# Patient Record
Sex: FEMALE | Race: WHITE | Employment: FULL TIME | URBAN - NONMETROPOLITAN AREA
[De-identification: names, ages, dates, MRNs, and addresses within clinical notes are randomized per-mention and may not be internally consistent; named-entity substitution may affect disease eponyms.]

---

## 2018-10-22 ENCOUNTER — OFFICE VISIT (OUTPATIENT)
Dept: PRIMARY CARE CLINIC | Age: 18
End: 2018-10-22
Payer: COMMERCIAL

## 2018-10-22 VITALS
WEIGHT: 154 LBS | RESPIRATION RATE: 18 BRPM | DIASTOLIC BLOOD PRESSURE: 72 MMHG | OXYGEN SATURATION: 97 % | SYSTOLIC BLOOD PRESSURE: 126 MMHG | HEIGHT: 63 IN | BODY MASS INDEX: 27.29 KG/M2 | TEMPERATURE: 97.7 F | HEART RATE: 74 BPM

## 2018-10-22 DIAGNOSIS — H10.31 ACUTE BACTERIAL CONJUNCTIVITIS OF RIGHT EYE: Primary | ICD-10-CM

## 2018-10-22 PROCEDURE — 99213 OFFICE O/P EST LOW 20 MIN: CPT | Performed by: FAMILY MEDICINE

## 2018-10-22 PROCEDURE — G8419 CALC BMI OUT NRM PARAM NOF/U: HCPCS | Performed by: FAMILY MEDICINE

## 2018-10-22 PROCEDURE — G8427 DOCREV CUR MEDS BY ELIG CLIN: HCPCS | Performed by: FAMILY MEDICINE

## 2018-10-22 PROCEDURE — 1036F TOBACCO NON-USER: CPT | Performed by: FAMILY MEDICINE

## 2018-10-22 PROCEDURE — G8484 FLU IMMUNIZE NO ADMIN: HCPCS | Performed by: FAMILY MEDICINE

## 2018-10-22 ASSESSMENT — ENCOUNTER SYMPTOMS
GASTROINTESTINAL NEGATIVE: 1
ALLERGIC/IMMUNOLOGIC NEGATIVE: 1
EYE PAIN: 0
RESPIRATORY NEGATIVE: 1
EYE DISCHARGE: 1
EYE REDNESS: 1

## 2018-10-22 NOTE — PROGRESS NOTES
encounter: 154 lb (69.9 kg). Physical Exam   Constitutional: She is oriented to person, place, and time. She appears well-developed and well-nourished. No distress. HENT:   Head: Normocephalic and atraumatic. Right Ear: External ear normal.   Left Ear: External ear normal.   Mouth/Throat: Oropharynx is clear and moist. No oropharyngeal exudate. Eyes: EOM are normal. Right conjunctiva is injected (mild sclera injection). No scleral icterus. Neck: Neck supple. No thyromegaly present. Cardiovascular: Normal rate, regular rhythm, normal heart sounds and intact distal pulses. No murmur heard. Pulmonary/Chest: Effort normal and breath sounds normal. No respiratory distress. She has no wheezes. Abdominal: Soft. Bowel sounds are normal. She exhibits no distension. There is no tenderness. There is no rebound. Musculoskeletal: Normal range of motion. She exhibits no edema or tenderness. Neurological: She is alert and oriented to person, place, and time. Skin: Skin is warm and dry. No rash noted. No erythema. Psychiatric: She has a normal mood and affect. Her behavior is normal. Judgment and thought content normal.   /72 (Site: Left Upper Arm, Position: Sitting, Cuff Size: Medium Adult)   Pulse 74   Temp 97.7 °F (36.5 °C) (Tympanic)   Resp 18   Ht 5' 2.5\" (1.588 m)   Wt 154 lb (69.9 kg)   SpO2 97%   BMI 27.72 kg/m²       ASSESSMENT/PLAN:  Conjunctivitis OD  maxitrol opth. Drops. Handwashing precautions. Recheck prn persistent or progressive symptoms  An electronic signature was used to authenticate this note.     --Benjamin Daugherty MD on 10/22/2018 at 1:51 PM

## 2018-10-29 ENCOUNTER — OFFICE VISIT (OUTPATIENT)
Dept: PRIMARY CARE CLINIC | Age: 18
End: 2018-10-29
Payer: COMMERCIAL

## 2018-10-29 VITALS
BODY MASS INDEX: 27.78 KG/M2 | HEIGHT: 63 IN | DIASTOLIC BLOOD PRESSURE: 80 MMHG | SYSTOLIC BLOOD PRESSURE: 100 MMHG | HEART RATE: 82 BPM | WEIGHT: 156.8 LBS | OXYGEN SATURATION: 98 %

## 2018-10-29 DIAGNOSIS — H10.32 ACUTE CONJUNCTIVITIS OF LEFT EYE, UNSPECIFIED ACUTE CONJUNCTIVITIS TYPE: Primary | ICD-10-CM

## 2018-10-29 PROCEDURE — G8484 FLU IMMUNIZE NO ADMIN: HCPCS | Performed by: NURSE PRACTITIONER

## 2018-10-29 PROCEDURE — G8427 DOCREV CUR MEDS BY ELIG CLIN: HCPCS | Performed by: NURSE PRACTITIONER

## 2018-10-29 PROCEDURE — 1036F TOBACCO NON-USER: CPT | Performed by: NURSE PRACTITIONER

## 2018-10-29 PROCEDURE — 99213 OFFICE O/P EST LOW 20 MIN: CPT | Performed by: NURSE PRACTITIONER

## 2018-10-29 PROCEDURE — G8419 CALC BMI OUT NRM PARAM NOF/U: HCPCS | Performed by: NURSE PRACTITIONER

## 2018-10-29 RX ORDER — ERYTHROMYCIN 5 MG/G
OINTMENT OPHTHALMIC
Qty: 1 TUBE | Refills: 0 | Status: ON HOLD | OUTPATIENT
Start: 2018-10-29 | End: 2020-05-04 | Stop reason: HOSPADM

## 2018-10-29 ASSESSMENT — PATIENT HEALTH QUESTIONNAIRE - PHQ9
1. LITTLE INTEREST OR PLEASURE IN DOING THINGS: 0
SUM OF ALL RESPONSES TO PHQ QUESTIONS 1-9: 0
SUM OF ALL RESPONSES TO PHQ QUESTIONS 1-9: 0
2. FEELING DOWN, DEPRESSED OR HOPELESS: 0
SUM OF ALL RESPONSES TO PHQ9 QUESTIONS 1 & 2: 0

## 2018-10-29 ASSESSMENT — ENCOUNTER SYMPTOMS
GASTROINTESTINAL NEGATIVE: 1
TROUBLE SWALLOWING: 0
SINUS PRESSURE: 0
COUGH: 0
RHINORRHEA: 0
RESPIRATORY NEGATIVE: 1
EYE REDNESS: 1
EYE DISCHARGE: 1

## 2018-10-30 ENCOUNTER — OFFICE VISIT (OUTPATIENT)
Dept: OPTOMETRY | Age: 18
End: 2018-10-30
Payer: COMMERCIAL

## 2018-10-30 DIAGNOSIS — H10.013 ACUTE FOLLICULAR CONJUNCTIVITIS OF BOTH EYES: Primary | ICD-10-CM

## 2018-10-30 DIAGNOSIS — H11.32 SUBCONJUNCTIVAL HEMORRHAGE OF LEFT EYE: ICD-10-CM

## 2018-10-30 PROCEDURE — 99203 OFFICE O/P NEW LOW 30 MIN: CPT | Performed by: OPTOMETRIST

## 2018-10-30 PROCEDURE — G8419 CALC BMI OUT NRM PARAM NOF/U: HCPCS | Performed by: OPTOMETRIST

## 2018-10-30 PROCEDURE — 1036F TOBACCO NON-USER: CPT | Performed by: OPTOMETRIST

## 2018-10-30 PROCEDURE — G8484 FLU IMMUNIZE NO ADMIN: HCPCS | Performed by: OPTOMETRIST

## 2018-10-30 PROCEDURE — G8427 DOCREV CUR MEDS BY ELIG CLIN: HCPCS | Performed by: OPTOMETRIST

## 2018-10-30 ASSESSMENT — VISUAL ACUITY
OS_SC: 20/25
METHOD: SNELLEN - LINEAR
OD_SC: 20/30

## 2020-05-02 ENCOUNTER — HOSPITAL ENCOUNTER (INPATIENT)
Age: 20
LOS: 2 days | Discharge: HOME | DRG: 885 | End: 2020-05-04
Attending: PSYCHIATRY & NEUROLOGY | Admitting: PSYCHIATRY & NEUROLOGY
Payer: COMMERCIAL

## 2020-05-02 PROBLEM — F32.9 MDD (MAJOR DEPRESSIVE DISORDER), SINGLE EPISODE: Status: ACTIVE | Noted: 2020-05-02

## 2020-05-02 LAB
ACETAMINOPHEN LEVEL: < 5 UG/ML (ref 0–20)
ALBUMIN SERPL-MCNC: 4.2 G/DL (ref 3.5–5.1)
ALP BLD-CCNC: 69 U/L (ref 38–126)
ALT SERPL-CCNC: 9 U/L (ref 11–66)
AMORPHOUS: ABNORMAL
AMPHETAMINE+METHAMPHETAMINE URINE SCREEN: NEGATIVE
ANION GAP SERPL CALCULATED.3IONS-SCNC: 11 MEQ/L (ref 8–16)
AST SERPL-CCNC: 11 U/L (ref 5–40)
BACTERIA: ABNORMAL
BARBITURATE QUANTITATIVE URINE: NEGATIVE
BASOPHILS # BLD: 0.5 %
BASOPHILS ABSOLUTE: 0 THOU/MM3 (ref 0–0.1)
BENZODIAZEPINE QUANTITATIVE URINE: NEGATIVE
BILIRUB SERPL-MCNC: 0.5 MG/DL (ref 0.3–1.2)
BILIRUBIN URINE: NEGATIVE
BLOOD, URINE: NEGATIVE
BUN BLDV-MCNC: 8 MG/DL (ref 7–22)
CALCIUM SERPL-MCNC: 9.2 MG/DL (ref 8.5–10.5)
CANNABINOID QUANTITATIVE URINE: NEGATIVE
CASTS: ABNORMAL /LPF
CASTS: ABNORMAL /LPF
CHARACTER, URINE: ABNORMAL
CHLORIDE BLD-SCNC: 106 MEQ/L (ref 98–111)
CO2: 24 MEQ/L (ref 23–33)
COCAINE METABOLITE QUANTITATIVE URINE: NEGATIVE
COLOR: YELLOW
CREAT SERPL-MCNC: 0.5 MG/DL (ref 0.4–1.2)
CRYSTALS: ABNORMAL
EOSINOPHIL # BLD: 0.7 %
EOSINOPHILS ABSOLUTE: 0.1 THOU/MM3 (ref 0–0.4)
EPITHELIAL CELLS, UA: ABNORMAL /HPF
ERYTHROCYTE [DISTWIDTH] IN BLOOD BY AUTOMATED COUNT: 12.1 % (ref 11.5–14.5)
ERYTHROCYTE [DISTWIDTH] IN BLOOD BY AUTOMATED COUNT: 41.7 FL (ref 35–45)
ETHYL ALCOHOL, SERUM: < 0.01 %
GFR SERPL CREATININE-BSD FRML MDRD: > 90 ML/MIN/1.73M2
GLUCOSE BLD-MCNC: 90 MG/DL (ref 70–108)
GLUCOSE, URINE: 250 MG/DL
HCT VFR BLD CALC: 44.2 % (ref 37–47)
HEMOGLOBIN: 14.7 GM/DL (ref 12–16)
IMMATURE GRANS (ABS): 0.01 THOU/MM3 (ref 0–0.07)
IMMATURE GRANULOCYTES: 0.1 %
KETONES, URINE: 40
LEUKOCYTE ESTERASE, URINE: ABNORMAL
LYMPHOCYTES # BLD: 25.5 %
LYMPHOCYTES ABSOLUTE: 2.2 THOU/MM3 (ref 1–4.8)
MCH RBC QN AUTO: 30.9 PG (ref 26–33)
MCHC RBC AUTO-ENTMCNC: 33.3 GM/DL (ref 32.2–35.5)
MCV RBC AUTO: 93.1 FL (ref 81–99)
MISCELLANEOUS LAB TEST RESULT: ABNORMAL
MONOCYTES # BLD: 9.6 %
MONOCYTES ABSOLUTE: 0.8 THOU/MM3 (ref 0.4–1.3)
MUCUS: ABNORMAL
NITRITE, URINE: NEGATIVE
NUCLEATED RED BLOOD CELLS: 0 /100 WBC
OPIATES, URINE: NEGATIVE
OSMOLALITY CALCULATION: 279.1 MOSMOL/KG (ref 275–300)
OXYCODONE: NEGATIVE
PH UA: 5 (ref 5–9)
PHENCYCLIDINE QUANTITATIVE URINE: NEGATIVE
PLATELET # BLD: 250 THOU/MM3 (ref 130–400)
PMV BLD AUTO: 10.9 FL (ref 9.4–12.4)
POTASSIUM SERPL-SCNC: 3.8 MEQ/L (ref 3.5–5.2)
PREGNANCY, SERUM: NEGATIVE
PROTEIN UA: NEGATIVE MG/DL
RBC # BLD: 4.75 MILL/MM3 (ref 4.2–5.4)
RBC URINE: ABNORMAL /HPF
RENAL EPITHELIAL, UA: ABNORMAL
SALICYLATE, SERUM: < 0.3 MG/DL (ref 2–10)
SEG NEUTROPHILS: 63.6 %
SEGMENTED NEUTROPHILS ABSOLUTE COUNT: 5.4 THOU/MM3 (ref 1.8–7.7)
SODIUM BLD-SCNC: 141 MEQ/L (ref 135–145)
SPECIFIC GRAVITY UA: 1.02 (ref 1–1.03)
TOTAL PROTEIN: 6.5 G/DL (ref 6.1–8)
TSH SERPL DL<=0.05 MIU/L-ACNC: 2.58 UIU/ML (ref 0.4–4.2)
UROBILINOGEN, URINE: 0.2 EU/DL (ref 0–1)
WBC # BLD: 8.5 THOU/MM3 (ref 4.8–10.8)
WBC UA: ABNORMAL /HPF
YEAST: ABNORMAL

## 2020-05-02 PROCEDURE — APPSS60 APP SPLIT SHARED TIME 46-60 MINUTES: Performed by: NURSE PRACTITIONER

## 2020-05-02 PROCEDURE — 81001 URINALYSIS AUTO W/SCOPE: CPT

## 2020-05-02 PROCEDURE — 84443 ASSAY THYROID STIM HORMONE: CPT

## 2020-05-02 PROCEDURE — 99223 1ST HOSP IP/OBS HIGH 75: CPT | Performed by: PSYCHIATRY & NEUROLOGY

## 2020-05-02 PROCEDURE — 99285 EMERGENCY DEPT VISIT HI MDM: CPT

## 2020-05-02 PROCEDURE — 85025 COMPLETE CBC W/AUTO DIFF WBC: CPT

## 2020-05-02 PROCEDURE — 36415 COLL VENOUS BLD VENIPUNCTURE: CPT

## 2020-05-02 PROCEDURE — 1240000000 HC EMOTIONAL WELLNESS R&B

## 2020-05-02 PROCEDURE — 80053 COMPREHEN METABOLIC PANEL: CPT

## 2020-05-02 PROCEDURE — 6370000000 HC RX 637 (ALT 250 FOR IP): Performed by: NURSE PRACTITIONER

## 2020-05-02 PROCEDURE — 84703 CHORIONIC GONADOTROPIN ASSAY: CPT

## 2020-05-02 PROCEDURE — G0480 DRUG TEST DEF 1-7 CLASSES: HCPCS

## 2020-05-02 PROCEDURE — 80307 DRUG TEST PRSMV CHEM ANLYZR: CPT

## 2020-05-02 PROCEDURE — 6370000000 HC RX 637 (ALT 250 FOR IP): Performed by: PSYCHIATRY & NEUROLOGY

## 2020-05-02 RX ORDER — TRAZODONE HYDROCHLORIDE 50 MG/1
50 TABLET ORAL NIGHTLY PRN
Status: DISCONTINUED | OUTPATIENT
Start: 2020-05-02 | End: 2020-05-04 | Stop reason: HOSPADM

## 2020-05-02 RX ORDER — NICOTINE 21 MG/24HR
1 PATCH, TRANSDERMAL 24 HOURS TRANSDERMAL DAILY
Status: DISCONTINUED | OUTPATIENT
Start: 2020-05-02 | End: 2020-05-02 | Stop reason: CLARIF

## 2020-05-02 RX ORDER — FLUOXETINE 10 MG/1
10 CAPSULE ORAL DAILY
Status: DISCONTINUED | OUTPATIENT
Start: 2020-05-02 | End: 2020-05-04 | Stop reason: HOSPADM

## 2020-05-02 RX ORDER — HYDROXYZINE HYDROCHLORIDE 25 MG/1
50 TABLET, FILM COATED ORAL 3 TIMES DAILY PRN
Status: DISCONTINUED | OUTPATIENT
Start: 2020-05-02 | End: 2020-05-04 | Stop reason: HOSPADM

## 2020-05-02 RX ORDER — MAGNESIUM HYDROXIDE/ALUMINUM HYDROXICE/SIMETHICONE 120; 1200; 1200 MG/30ML; MG/30ML; MG/30ML
30 SUSPENSION ORAL EVERY 6 HOURS PRN
Status: DISCONTINUED | OUTPATIENT
Start: 2020-05-02 | End: 2020-05-04 | Stop reason: HOSPADM

## 2020-05-02 RX ORDER — ACETAMINOPHEN 325 MG/1
650 TABLET ORAL EVERY 4 HOURS PRN
Status: DISCONTINUED | OUTPATIENT
Start: 2020-05-02 | End: 2020-05-04 | Stop reason: HOSPADM

## 2020-05-02 RX ORDER — IBUPROFEN 200 MG
400 TABLET ORAL EVERY 6 HOURS PRN
Status: DISCONTINUED | OUTPATIENT
Start: 2020-05-02 | End: 2020-05-04 | Stop reason: HOSPADM

## 2020-05-02 RX ADMIN — FLUOXETINE HYDROCHLORIDE 10 MG: 10 CAPSULE ORAL at 07:54

## 2020-05-02 RX ADMIN — HYDROXYZINE HYDROCHLORIDE 50 MG: 25 TABLET ORAL at 15:24

## 2020-05-02 ASSESSMENT — SLEEP AND FATIGUE QUESTIONNAIRES
RESTFUL SLEEP: NO
DIFFICULTY FALLING ASLEEP: YES
DIFFICULTY STAYING ASLEEP: YES
DIFFICULTY ARISING: YES
DIFFICULTY ARISING: YES
DIFFICULTY FALLING ASLEEP: YES
DO YOU HAVE DIFFICULTY SLEEPING: YES
AVERAGE NUMBER OF SLEEP HOURS: 6
SLEEP PATTERN: INSOMNIA
DO YOU USE A SLEEP AID: NO
AVERAGE NUMBER OF SLEEP HOURS: 5
RESTFUL SLEEP: NO
DIFFICULTY STAYING ASLEEP: YES
SLEEP PATTERN: DIFFICULTY FALLING ASLEEP;DISTURBED/INTERRUPTED SLEEP;DIFFICULTY ARISING
DO YOU HAVE DIFFICULTY SLEEPING: YES
AVERAGE NUMBER OF SLEEP HOURS: 5
DO YOU USE A SLEEP AID: NO
RESTFUL SLEEP: NO
DIFFICULTY ARISING: YES
SLEEP PATTERN: DIFFICULTY FALLING ASLEEP;DISTURBED/INTERRUPTED SLEEP;DIFFICULTY ARISING
DIFFICULTY STAYING ASLEEP: YES
DIFFICULTY FALLING ASLEEP: YES
DO YOU HAVE DIFFICULTY SLEEPING: YES

## 2020-05-02 ASSESSMENT — LIFESTYLE VARIABLES
HISTORY_ALCOHOL_USE: NO
HISTORY_ALCOHOL_USE: NO

## 2020-05-02 ASSESSMENT — ENCOUNTER SYMPTOMS
ABDOMINAL DISTENTION: 0
VOMITING: 0
SHORTNESS OF BREATH: 0
CHEST TIGHTNESS: 0
NAUSEA: 0
ABDOMINAL PAIN: 0
COLOR CHANGE: 0
RHINORRHEA: 0

## 2020-05-02 ASSESSMENT — PATIENT HEALTH QUESTIONNAIRE - PHQ9
SUM OF ALL RESPONSES TO PHQ QUESTIONS 1-9: 25
SUM OF ALL RESPONSES TO PHQ QUESTIONS 1-9: 27
SUM OF ALL RESPONSES TO PHQ QUESTIONS 1-9: 27

## 2020-05-02 ASSESSMENT — PAIN SCALES - GENERAL
PAINLEVEL_OUTOF10: 0
PAINLEVEL_OUTOF10: 0

## 2020-05-02 NOTE — PROGRESS NOTES
BHI Biopsychosocial Assessment    Current Level of Psychosocial Functioning     Independent XXX  Dependent    Minimal Assist     Comments:  None    Psychosocial High Risk Factors (check all that apply)    Unable to obtain meds   Chronic illness/pain    Substance abuse   Lack of Family Support   Financial stress   Isolation XXX  Inadequate Community Resources XXX  Suicide attempt(s)  Not taking medications   Victim of crime   Developmental Delay  Unable to manage personal needs    Age 72 or older   Homeless  No transportation   Readmission within 30 days  Unemployment XXX (COVID-19)  Traumatic Event    Psychiatric Advanced Directive: None reported    Family to involve in treatment: Yes    Sexual Orientation:  Bisexual     Patient Strengths: Positive support, housing, motivated for services    Patient Barriers: No outside provider    Opiate education provided: N/A    Safety plan: Contracts for safety    CMHC/MH history: Past MH treatment when client was a child reported     Plan of Care:  medication management, group/individual therapies, family meetings, psycho -education, treatment team meetings to assist with stabilization    Initial Discharge Plan:  Ongoing/daily    Clinical Summary:      Catarina Enamorado is a 23year old single female admitted to  after being presented the the ED voluntarily reporting she has been feeling \"really depressed due to COVID-19\". It was reported patient has suicidal thought/plan/intent to write a note to her family and friends to go to the park where it is \"peaceful\" to \"do it\". Patient reports that she \"needs help\" that is why she came to the ED because she \"would not be here otherwise\". Patient reports increased depression due to COVID-19 and a recent breakup with her boyfriend whom she lives with. Patient reports she has been dealing with depression for a \"longtime now\".  Patient states she has received MH treatment when she was a

## 2020-05-02 NOTE — ED PROVIDER NOTES
Relationship status: Not on file    Intimate partner violence     Fear of current or ex partner: Not on file     Emotionally abused: Not on file     Physically abused: Not on file     Forced sexual activity: Not on file   Other Topics Concern    Not on file   Social History Narrative    Parents  with joint custody. PHYSICAL EXAM     INITIAL VITALS:  height is 5' 5\" (1.651 m) and weight is 175 lb (79.4 kg). Her oral temperature is 98.1 °F (36.7 °C). Her blood pressure is 126/78 and her pulse is 97. Her respiration is 18 and oxygen saturation is 98%. Physical Exam  Constitutional:       General: She is not in acute distress. Appearance: Normal appearance. HENT:      Head: Normocephalic. Nose: Nose normal.   Neck:      Musculoskeletal: Normal range of motion. Cardiovascular:      Rate and Rhythm: Normal rate and regular rhythm. Pulses: Normal pulses. Pulmonary:      Effort: Pulmonary effort is normal.      Breath sounds: Normal breath sounds. Abdominal:      General: Abdomen is flat. Palpations: Abdomen is soft. Musculoskeletal: Normal range of motion. Skin:     General: Skin is warm. Capillary Refill: Capillary refill takes less than 2 seconds. Neurological:      General: No focal deficit present. Mental Status: She is alert. Psychiatric:         Attention and Perception: Attention normal.         Mood and Affect: Mood is anxious and depressed. Speech: Speech is rapid and pressured. Behavior: Behavior normal.         Thought Content: Thought content includes suicidal ideation. Thought content includes suicidal plan. DIFFERENTIAL DIAGNOSIS:   Depression, bipolar disorder, suicidal ideation    DIAGNOSTIC RESULTS   .      RADIOLOGY: non-plainfilm images(s) such as CT, Ultrasound and MRI are read by the radiologist.  Plain radiographic images are visualized and preliminarily interpreted by the emergency physician unless otherwise stated 50 mg (has no administration in time range)   magnesium hydroxide (MILK OF MAGNESIA) 400 MG/5ML suspension 30 mL (has no administration in time range)   aluminum & magnesium hydroxide-simethicone (MAALOX) 200-200-20 MG/5ML suspension 30 mL (has no administration in time range)   nicotine (NICODERM CQ) 14 MG/24HR 1 patch (has no administration in time range)       Patient was seenindependently by myself. The patient's final impression and disposition and plan was determined by myself. CRITICAL CARE:   None    CONSULTS:  None    PROCEDURES:  None    FINAL IMPRESSION     1. Episode of recurrent major depressive disorder, unspecified depression episode severity [facility])          DISPOSITION/PLAN   Patient admitted to the psychiatric service. PATIENT REFERREDTO:  Unknown Provider Result            DISCHARGE MEDICATIONS:  New Prescriptions    No medications on file       (Please note that portions of this note were completed with a voice recognition program.  Efforts were made to edit the dictations but occasionally words are mis-transcribed.)      Provider:  I personally performed the services described in the documentation,reviewed and edited the documentation which was dictated to the scribe in my presence, and it accurately records my words and actions.     Billy De La Cruz CNP 05/02/20 6:22 AM    Hanh De La Cruz, APRN - CNP        Rumble, APRN - CNP  05/02/20 4674

## 2020-05-02 NOTE — ED NOTES
Patient placed in safe room that is ligature resistant with continuous monitoring in place. Provider notified, requested an assessment by behavioral health . Patient belongings secured in a locked lockers outside of the room. Explained suicide prevention precautions to the patient including constant observer.         Magdaleno Diez RN  05/02/20 8721

## 2020-05-02 NOTE — PROGRESS NOTES
Provisional Diagnosis:   Unspecified Depression/ Unspecified Mood disorder     Risk, Psychosocial and Contextual Factors: support, connection to services     Current  Treatment:   denied    Present Suicidal Behavior:  yes    Verbal:  yes        Attempt:denied    Access to Weapons:  Yes knifes and pills     Current Suicide Risk: Low, Moderate or High:    moderate    Past Suicidal Behavior:   yes    Verbal: yes    Attempt: denied     Self-Injurious/Self-Mutilation: denied    Traumatic Event Within Past 2 Weeks:   COVID-19    Current Abuse: denied    Legal: denied    Violence: denied    Protective Factors:  Social support, housing, motivation for services    Housing:    ying    CPAP/Oxygen/Ambulation Difficulties: denied    Basic Vital Signs Normal?: Check with Patients Nurse prior to Calling Psychiatry    Critical Labs?: Check with Patients Nurse prior to Calling Psychiatry    Clinical Summary:      Patient is a 23year old female who presents to the ED voluntarily reporting that she has been feeling \"really depressed due to COVID-19\". Patient states suicidal thought/plan/intent to write a note to family and friends and go to the park where it is \"peaceful\" to \"do it\". Patient reports that she has not wanted to take her mind to what she might \"do\" to end her life. Patient reports that she \"needs help\" that is why she came in today because she \"would not be here otherwise\". Patient reports past drug use but is unable to give  a timeline of use/quanity/frequency. Patient states that she drinks alcohol occassionally. Homicidal thoughts and/or plans denied. No delusions noted. Hallucinations denied. Patient was connected to services when she was younger and would like to get connected again. Level of Care Disposition:      Consulted with medical provider.    Consulted with - disposition to 4E

## 2020-05-02 NOTE — H&P
. Reports recent breakup with boyfriend after 2 year relationship. 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 LIVING SITUATION: Lives with exboyfriend  and 3 roommates in [place], [place] Denies having any children   LEGAL:Denies ever being arrested    MSE:  Level of consciousness: Alert  Appearance: in chair and fair grooming   Behavior/Motor: Tearful at times   Attitude toward examiner: cooperative   Speech: Normal volume, goal directed, NRR  Mood: Dysthymic  Affect: Reactive  Thought processes: Linear and goal directed   Suicidal Ideation: Denies suicidal ideations  Homicidal ideation: Denies homicidal ideations  Delusions: No evidence of delusions is observed  Perceptual Disturbance: Denies AH/VH;  No evidence of psychosis is observed. Cognition: Oriented to person, place, time and situation   Concentration fair   Memory intact   Insight: Limited  Judgment: Limited    ROS:  Constitutional: Appears well-developed; No acute distress  HENT:   Head: Normocephalic and atraumatic. Negative for ear pain, congestion, rhinorrhea and neck pain. Eyes: Conjunctivae are normal.  no discharge noted from eyes bilat. . No scleral icterus. Neck: Normal range of motion. Pulmonary/Chest:  No respiratory distress or accessory muscle use, no wheezing. Abdominal: No distension. Musculoskeletal: Normal range of motion. He exhibits no edema. Negative for myalgias, back pain and arthralgias. Neurological: cranial nerves II-XII grossly in tact, normal gait and station. Negative for dizziness, weakness or headaches. Skin: Skin is warm and dry. Not diaphoretic. No erythema. Cardiovascular: Negative for chest pain, palpitations and leg swelling. Gastrointestinal: Negative for nausea, vomiting and diarrhea. Genitourinary: Negative for dysuria and frequency.      Impression:  Major Depressive D/O recurrent severe without psychsosis    Plan:  Admit to E-4 psychiatric unit for symptom stabilization and medication management. Introduce to unit milieu, groups and individual therapies. Start Prozac 10mg po q am for C/O depression   Chester given risks and benefits of being on Prozac if becomes pregnant. Nicole Miranda voices understanding of risks and benefits and states she wants to move forwards with taking prozac. Behavioral Services  Medicare Certification     Admission Day 1  I certify that this patient's inpatient psychiatric hospital admission is medically necessary for:    (1) treatment which could reasonably be expected to improve this patient's condition, or    (2) diagnostic study or its equivalent. Physicians Signature: Electronically signed by Kash Gómez CNP [id number]                                    Psychiatry Attending Attestation     I assessed this patient and reviewed the case and plan of care with Kash Gómez CNP. I have reviewed the above documentation and I agree with the findings and treatment plan with the following updates. Patient is a 64-year-old single  female with history of depression presented for worsening suicidal thoughts with a plan to kill self in a park. Patient reports she has been feeling down sad and low for more days and off for last several weeks now. Endorses anhedonia. Identifies stressors as recently breaking up with her boyfriend. Reports poor energy and concentration. Reports having trouble falling asleep and staying asleep. Reports poor appetite. Reports feelings of helplessness hopelessness and worthlessness. Patient reports that she has been dealing with chronic feelings of emptiness. Also identifies stressors as relapsing on self injurious behaviors after 6 years. Patient reports having some abandonment issues from her family as a child. Reports some intense emotions. Discussed with the patient that she has lot of cluster B traits and would benefit a lot from therapy.

## 2020-05-02 NOTE — ED NOTES
ED to inpatient nurses report    Chief Complaint   Patient presents with    Suicidal      Present to ED from home  LOC: alert and orientated to name, place, date  Vital signs   Vitals:    05/02/20 0258   BP: 135/85   Pulse: 106   Resp: 22   Temp: 98.1 °F (36.7 °C)   TempSrc: Oral   SpO2: 97%   Weight: 175 lb (79.4 kg)   Height: 5' 5\" (1.651 m)      Oxygen Baseline 97    Current needs required None  Bipap/Cpap No  LDAs:    Mobility: Independent  Pending ED orders: none   Present condition: stable     Electronically signed by Wild Smith, RN on 5/2/2020 at 00981 [facility], RN  05/02/20 7655

## 2020-05-02 NOTE — PLAN OF CARE
safety  Outcome: Not Met This Shift  Note: Did not complete this shift  Care plan reviewed with patient . Patient  verbalize understanding of the plan of care and contribute to goal setting.

## 2020-05-02 NOTE — PROGRESS NOTES
Behavioral Health   Admission Note     Admission Type:   Admission Type: Voluntary    Reason for admission:  Reason for Admission: suicidal thoughts    PATIENT STRENGTHS:  Strengths: Communication, Positive Support    Patient Strengths and Limitations:  Limitations: Tendency to isolate self    Addictive Behavior:   Addictive Behavior  In the past 3 months, have you felt or has someone told you that you have a problem with:  : None  Do you have a history of Chemical Use?: No  Do you have a history of Alcohol Use?: No  Do you have a history of Street Drug Abuse?: No  Histroy of Prescripton Drug Abuse?: No    Medical Problems:   Past Medical History:   Diagnosis Date    Gallbladder & bile duct stone with obstruction     Psychiatric problem        Status EXAM:  Status and Exam  Normal: No  Facial Expression: Sad  Affect: Blunt  Level of Consciousness: Alert  Mood:Normal: No  Mood: Depressed, Anxious, Sad  Motor Activity:Normal: Yes  Interview Behavior: Cooperative  Preception: [place] to Person, Yosef Andreas to Time, [place] to Place, [place] to Situation  Attention:Normal: Yes  Thought Processes: Other(See comment)(Linear)  Thought Content:Normal: Yes  Hallucinations: None  Delusions: No  Memory:Normal: Yes  Memory: (no problems)  Insight and Judgment: Yes  Present Suicidal Ideation: Other(See comment)(PTA \"patient didn't feel safe being by myself, if I didn't get help I would snap\")  Present Homicidal Ideation: No    Pt admitted with followings belongings:  Dentures: None  Vision - Corrective Lenses: Glasses  Hearing Aid: None  Jewelry: Ring, Earrings, Other (Comment)(tongue, nose, belly button piercing)  Body Piercings Removed: No  Clothing: Footwear, Pants, Shirt, Undergarments (Comment)  Were All Patient Medications Collected?: Not Applicable  Other Valuables: Cell phone, Money (Comment), Wallet     Admission order obtained yes. Valuables sent home with n/a. Valuables placed in safe in security envelope, number:  [id number]. Patient's home medications were patient states she has not taken medication for a couple yes, prior medications were Zoloft and Strattera. Patient oriented to surroundings and program expectations and copy of patient rights given. Received admission packet:  yes. Consents reviewed, signed yes. \"An Important Message from [employer] About Your Rights\" form reviewed, signed n/a. Refused n/a. Patient verbalize understanding:  yes. Patient education on precautions: yes           Patient screened positive for suicide risk on CSSR-S (\"yes\" to question #4, 5, OR 6)  yes. Physician notified of risk score. Orders received no new orders received . Explained patients right to have family, representative or physician notified of their admission. Patient has Declined for physician to be notified. Patient has Declined for family/representative to be notified. Provided pt with [employer] handout entitled \"Quitting Smoking. \"  Reviewed handout with pt addressing dangers of smoking, developing coping skills, and providing basic information about quitting. Pt response to counseling:  Patient does not smoke cigarettes        Patient having thoughts she would be better off dead, told father and was brought to [facility] by a friend.   Karilyn Peabody, RN

## 2020-05-03 PROCEDURE — APPSS15 APP SPLIT SHARED TIME 0-15 MINUTES: Performed by: NURSE PRACTITIONER

## 2020-05-03 PROCEDURE — 1240000000 HC EMOTIONAL WELLNESS R&B

## 2020-05-03 PROCEDURE — 6370000000 HC RX 637 (ALT 250 FOR IP): Performed by: NURSE PRACTITIONER

## 2020-05-03 PROCEDURE — 90833 PSYTX W PT W E/M 30 MIN: CPT | Performed by: PSYCHIATRY & NEUROLOGY

## 2020-05-03 PROCEDURE — 99232 SBSQ HOSP IP/OBS MODERATE 35: CPT | Performed by: PSYCHIATRY & NEUROLOGY

## 2020-05-03 RX ADMIN — FLUOXETINE HYDROCHLORIDE 10 MG: 10 CAPSULE ORAL at 07:43

## 2020-05-03 ASSESSMENT — PAIN SCALES - GENERAL: PAINLEVEL_OUTOF10: 0

## 2020-05-03 NOTE — PLAN OF CARE
Patient voices no  needs before discharge. Patient plans to be discharged to home with friend . Discharge planner working with patient to achieve optimal discharge plans, specific to individual needs.    5/2/2020 2129 by Coco Henriquez RN  Outcome: Ongoing  Note: Pt plans to return home with her roommates     Problem: KNOWLEDGE DEFICIT,EDUCATION,DISCHARGE PLAN  Goal: Knowledge - personal safety  5/3/2020 0935 by Pieter Timmons RN  Outcome: Ongoing  5/2/2020 2129 by Coco Henriquez RN  Outcome: Ongoing  Note: Pt did not work on safety plan this evening     Problem: Anxiety:  Goal: Level of anxiety will decrease  Description: Level of anxiety will decrease  Outcome: Ongoing     Problem: Altered Mood, Depressive Behavior:  Goal: Ability to disclose and discuss suicidal ideas will improve  Description: Ability to disclose and discuss suicidal ideas will improve  5/3/2020 0935 by Pieter Timmons RN  Outcome: Completed  5/2/2020 2129 by Coco Henriquez RN  Outcome: Met This Shift  Note: Pt denies suicidal thoughts  Goal: Able to verbalize support systems  Description: Able to verbalize support systems  5/2/2020 2129 by Coco Henriquez RN  Outcome: Completed  Note: Pt states [place] her best friend is her main support, he is also her ex boyfriend  Goal: Absence of self-harm  Description: Absence of self-harm  5/3/2020 0935 by Pieter Timmons RN  Outcome: Completed  5/2/2020 2129 by Coco Henriquez RN  Outcome: Met This Shift  Note: No self harm, denied urge to self harm

## 2020-05-03 NOTE — PROGRESS NOTES
Psychiatry Progress Note      CC: Major Depressive D/O recurrent severe without psychosis                    HPI:  Cheryl Gandhi is a 22 y/o female presented voluntarily to [facility] [place] [facility] with feeling of depression due to quarantine from Covid 19 and recent break up with boyfriend. Also had suicidal thoughts. Progress:  Cheryl Gandhi reports feeling better today. Reports depression is less. Denies feelings of harm towards self or others. Feels it is toearly to tell if Prozac is working. Denies having side effects. Good med compliance is reported. Reports appetite is good and slept well last night. Verified slept 8 hours. Denies going to groups yesterday. Reports talked to parents and ex boyfriend yesterday on phone. Reports conversation went well. States she has had a lot of time to think and feels she is not ready for a relationship. MSE:  Level of consciousness: Alert  Appearance: hospital attire, in chair and fair grooming   Behavior/Motor: no abnormalities noted   Attitude toward examiner: cooperative   Speech: Normal volume, goal directed, NRR  Mood: Euthymic  Affect: Reactive  Thought processes: Linear and goal directed   Suicidal Ideation: Denies suicidal ideations  Homicidal ideation: Denies homicidal ideations  Delusions: No evidence of delusions is observed  Perceptual Disturbance: Denies AH/VH;  No evidence of psychosis is observed. Cognition: Oriented to person, place, time and situation   Concentration fair   Memory intact   Insight: Fair  Judgment: Fair    Assessment:  Major Depressive D/O recurrent severe without psychosis    Plan:  Continue current meds as ordered  Continue to encourage group attendance. Daniela Robledo CNP  [phone]                                        Psychiatry Attending Attestation     I assessed this patient and reviewed the case and plan of care with Daniela Robledo CNP.   I have reviewed the above documentation and I agree with the findings and treatment plan with

## 2020-05-03 NOTE — PLAN OF CARE
Problem: Altered Mood, Depressive Behavior:  Goal: Ability to disclose and discuss suicidal ideas will improve  Description: Ability to disclose and discuss suicidal ideas will improve  5/2/2020 2129 by Avni Moore RN  Outcome: Met This Shift  Note: Pt denies suicidal thoughts  5/2/2020 1044 by Livan Ramey RN  Outcome: Ongoing  Note: Denies suicidal thoughts at present, but states \"I just don't want to feel this way anymore\"  Goal: Absence of self-harm  Description: Absence of self-harm  5/2/2020 2129 by Avni Moore RN  Outcome: Met This Shift  Note: No self harm, denied urge to self harm  5/2/2020 1044 by Livan Ramey RN  Outcome: Met This Shift  Goal: Patient specific goal  Description: Patient specific goal  5/2/2020 2129 by Avni Moore RN  Outcome: Met This Shift  Note: Met goal of decreased anxiety, had atarax on previous shift  5/2/2020 1044 by Livan Ramey RN  Outcome: Ongoing  Note: Goal \"stop feeling anxious\"  Goal: Participates in care planning  Description: Participates in care planning  5/2/2020 2129 by Avni Moore RN  Outcome: Met This Shift  Note: Care plan reviewed with patient. Patient does verbalize understanding of the plan of care and does contribute to goal setting     5/2/2020 1044 by Livan Ramey RN  Outcome: Ongoing  Note: Discussed tx plan with patient. Encouraged to attend groups and take medications as prescribed. Problem: Altered Mood, Depressive Behavior:  Goal: Able to verbalize acceptance of life and situations over which he or she has no control  Description: Able to verbalize acceptance of life and situations over which he or she has no control  5/2/2020 2129 by Avni Moore RN  Outcome: Ongoing  Note: Pt is working towards acceptance  5/2/2020 1044 by Livan Ramey RN  Outcome: Ongoing  Note: Patient able to verbalize acceptance of life and situations over which he has no control.    Goal: Able to verbalize and/or display a

## 2020-05-04 VITALS
WEIGHT: 148 LBS | HEART RATE: 68 BPM | TEMPERATURE: 97.9 F | HEIGHT: 63 IN | OXYGEN SATURATION: 93 % | DIASTOLIC BLOOD PRESSURE: 56 MMHG | BODY MASS INDEX: 26.22 KG/M2 | SYSTOLIC BLOOD PRESSURE: 98 MMHG | RESPIRATION RATE: 16 BRPM

## 2020-05-04 PROCEDURE — 6370000000 HC RX 637 (ALT 250 FOR IP): Performed by: PSYCHIATRY & NEUROLOGY

## 2020-05-04 PROCEDURE — 5130000000 HC BRIDGE APPOINTMENT

## 2020-05-04 PROCEDURE — 6370000000 HC RX 637 (ALT 250 FOR IP): Performed by: NURSE PRACTITIONER

## 2020-05-04 PROCEDURE — 99239 HOSP IP/OBS DSCHRG MGMT >30: CPT | Performed by: PSYCHIATRY & NEUROLOGY

## 2020-05-04 RX ORDER — FLUOXETINE 10 MG/1
10 CAPSULE ORAL DAILY
Qty: 30 CAPSULE | Refills: 0 | Status: ON HOLD
Start: 2020-05-05 | End: 2020-07-09 | Stop reason: HOSPADM

## 2020-05-04 RX ADMIN — IBUPROFEN 400 MG: 200 TABLET, FILM COATED ORAL at 09:33

## 2020-05-04 RX ADMIN — FLUOXETINE HYDROCHLORIDE 10 MG: 10 CAPSULE ORAL at 08:41

## 2020-05-04 ASSESSMENT — PAIN SCALES - GENERAL
PAINLEVEL_OUTOF10: 3
PAINLEVEL_OUTOF10: 5

## 2020-05-04 ASSESSMENT — PAIN DESCRIPTION - LOCATION: LOCATION: NOSE

## 2020-05-04 ASSESSMENT — PAIN DESCRIPTION - PAIN TYPE: TYPE: ACUTE PAIN

## 2020-05-04 NOTE — PROGRESS NOTES
Group Therapy Note    Date: 5/3/2020  Start Time: 2000  End Time:  2020      Type of Group: Wrap-Up and relaxation    Patient's Goal: \" To get closer to be ready for discharge\"    Notes:  Progressing toward    Status After Intervention:  Improved    Participation Level:  Active Listener and Interactive    Participation Quality: Appropriate and Attentive      Speech:  normal      Thought Process/Content: Logical      Affective Functioning: Congruent      Mood: depressed and patient reports that she is leveling out      Level of consciousness:  Alert and Oriented x4      Response to Learning: Able to verbalize current knowledge/experience      Endings: None Reported    Modes of Intervention: Support and Socialization      Discipline Responsible: Registered Nurse      Signature:  Emilio Crabtree RN

## 2020-05-04 NOTE — BH NOTE
Group Therapy Note    Date: 5/4/2020  Start Time:  1000  End Time:   1020  Number of Participants:  7     Type of Group: Recreational/Coping Skills    Patient's Goal:   Increase knowledge of positive coping skills. Notes:   Patient participated in a healthy coping skills word search puzzle and was able to identify multiple positive coping skills. Patient was also social with others in group.      Status After Intervention:  Improved    Participation Level: Interactive    Participation Quality: Appropriate, Attentive and Sharing      Speech:  normal      Thought Process/Content: Logical      Affective Functioning: Congruent      Mood: euthymic      Level of consciousness:  Oriented x4      Response to Learning: Able to verbalize current knowledge/experience, Capable of insight and Progressing to goal      Endings: None Reported    Modes of Intervention: Education, Socialization, Exploration and Activity      Discipline Responsible: Psychoeducational Specialist      Signature:  Hamida Barr

## 2020-05-04 NOTE — DISCHARGE SUMMARY
therapy. Also discussed about starting Prozac to help with her mood. Hospital Course:   Upon admission, Justin Bui was provided a safe secure environment, introduced to unit milieu. Patient participated in groups and individual therapies. Meds were adjusted as noted below. After few days of hospital care, patient began to feel improvement. Depression lifted, thoughts to harm self ceased. Sleep improved, appetite was good. On morning rounds 5/4/2020, Justin Bui  endorses feeling ready for discharge. Patient denies suicidal or homicidal ideations, denies hallucinations or delusions. Denies SE's from meds. It was decided that maximum benefit from hospital care had been achieved and patient can be discharged. Consults:   none    Significant Diagnostic Studies: Routine labs and diagnostics    Treatments: Psychotropic medications, therapy with group, milieu, and 1:1 with nurses, social workers, PA-C/CNP, and Attending physician.       Discharge Medications:  Current Discharge Medication List      START taking these medications    Details   FLUoxetine (PROZAC) 10 MG capsule Take 1 capsule by mouth daily  Qty: 30 capsule, Refills: 0         STOP taking these medications       erythromycin (ROMYCIN) 5 MG/GM ophthalmic ointment Comments:   Reason for Stopping:         sertraline (ZOLOFT) 50 MG tablet Comments:   Reason for Stopping:         atomoxetine (STRATTERA) 60 MG capsule Comments:   Reason for Stopping:                  Discharge Exam:  Level of consciousness:  Within normal limits  Appearance: Street clothes, seated, with good grooming  Behavior/Motor: No abnormalities noted  Attitude toward examiner:  Cooperative, attentive, good eye contact  Speech:  spontaneous, normal rate, normal volume and well articulated  Mood:  euthymic  Affect:  Full range  Thought processes:  linear, goal directed and coherent  Thought content:  denies homicidal ideation  Suicidal Ideation:  denies suicidal

## 2020-05-04 NOTE — PLAN OF CARE
Problem: Altered Mood, Depressive Behavior:  Goal: Patient specific goal  Description: Patient specific goal  5/3/2020 2023 by Juan Leyva RN  Outcome: Met This Shift  5/3/2020 0935 by Ephraim Miranda RN  Outcome: Ongoing  Goal: Participates in care planning  Description: Participates in care planning  5/3/2020 2023 by Juan Leyva RN  Outcome: Met This Shift  5/3/2020 0935 by Ephraim Miranda RN  Outcome: Ongoing     Problem: Anxiety:  Goal: Level of anxiety will decrease  Description: Level of anxiety will decrease  5/3/2020 2023 by Juan Leyva RN  Outcome: Met This Shift  5/3/2020 0935 by Ephraim Miranda RN  Outcome: Ongoing     Problem: Altered Mood, Depressive Behavior:  Goal: Able to verbalize acceptance of life and situations over which he or she has no control  Description: Able to verbalize acceptance of life and situations over which he or she has no control  5/3/2020 2023 by Juan Leyva RN  Outcome: Ongoing  5/3/2020 0935 by Ephraim Miranda RN  Outcome: Ongoing  Goal: Able to verbalize and/or display a decrease in depressive symptoms  Description: Able to verbalize and/or display a decrease in depressive symptoms  5/3/2020 2023 by Juan Leyva RN  Outcome: Ongoing  5/3/2020 0935 by Ephraim Miranda RN  Outcome: Ongoing     Problem: Discharge Planning:  Goal: Discharged to appropriate level of care  Description: Discharged to appropriate level of care  5/3/2020 2023 by Juan Leyva RN  Outcome: Ongoing  5/3/2020 0935 by Ephraim Miranda RN  Outcome: Ongoing     Problem: KNOWLEDGE DEFICIT,EDUCATION,DISCHARGE PLAN  Goal: Knowledge - personal safety  5/3/2020 2023 by Juan Leyva RN  Outcome: Ongoing  5/3/2020 0935 by Ephraim Miranda RN  Outcome: Ongoing     Problem: Altered Mood, Depressive Behavior:  Goal: Ability to disclose and discuss suicidal ideas will improve  Description: Ability to disclose and discuss suicidal ideas will improve  5/3/2020 0935 by Mj Claudio GEORGIE Orellana  Outcome: Completed  Goal: Absence of self-harm  Description: Absence of self-harm  5/3/2020 0935 by Curly Smith RN  Outcome: Completed

## 2020-05-04 NOTE — BH NOTE
PLAN OF CARE:     Start Time: 0900  End Time:   0930    Group Topic:  Daily Goals    Group Type:   Goal Group    Intervention/Goal:  To increase support and identify daily goals    Attendance:  Attended group    Affect: brightens with interaction    Behavior:  Pleasant and cooperative    Response:  Identified a daily goal for today. Daily Goal: To be able to recognize when I'm becoming manic.      Progress:  Progressing to goal

## 2020-05-05 ENCOUNTER — TELEPHONE (OUTPATIENT)
Dept: PSYCHIATRY | Age: 20
End: 2020-05-05

## 2020-07-06 ENCOUNTER — HOSPITAL ENCOUNTER (INPATIENT)
Age: 20
LOS: 3 days | Discharge: HOME | DRG: 885 | End: 2020-07-09
Attending: PSYCHIATRY & NEUROLOGY | Admitting: PSYCHIATRY & NEUROLOGY
Payer: COMMERCIAL

## 2020-07-06 PROBLEM — F31.9 BIPOLAR DISORDER, UNSPECIFIED (HCC): Status: ACTIVE | Noted: 2020-07-06

## 2020-07-06 LAB
ACETAMINOPHEN LEVEL: < 5 UG/ML (ref 0–20)
ALBUMIN SERPL-MCNC: 3.8 G/DL (ref 3.5–5.1)
ALP BLD-CCNC: 86 U/L (ref 38–126)
ALT SERPL-CCNC: 10 U/L (ref 11–66)
AMORPHOUS: ABNORMAL
AMPHETAMINE+METHAMPHETAMINE URINE SCREEN: NEGATIVE
ANION GAP SERPL CALCULATED.3IONS-SCNC: 11 MEQ/L (ref 8–16)
AST SERPL-CCNC: 11 U/L (ref 5–40)
BACTERIA: ABNORMAL /HPF
BARBITURATE QUANTITATIVE URINE: NEGATIVE
BASOPHILS # BLD: 0.5 %
BASOPHILS ABSOLUTE: 0 THOU/MM3 (ref 0–0.1)
BENZODIAZEPINE QUANTITATIVE URINE: NEGATIVE
BILIRUB SERPL-MCNC: 0.4 MG/DL (ref 0.3–1.2)
BILIRUBIN DIRECT: < 0.2 MG/DL (ref 0–0.3)
BILIRUBIN URINE: NEGATIVE
BLOOD, URINE: NEGATIVE
BUN BLDV-MCNC: 6 MG/DL (ref 7–22)
CALCIUM SERPL-MCNC: 8.5 MG/DL (ref 8.5–10.5)
CANNABINOID QUANTITATIVE URINE: POSITIVE
CASTS 2: ABNORMAL /LPF
CASTS UA: ABNORMAL /LPF
CHARACTER, URINE: ABNORMAL
CHLORIDE BLD-SCNC: 105 MEQ/L (ref 98–111)
CO2: 25 MEQ/L (ref 23–33)
COCAINE METABOLITE QUANTITATIVE URINE: NEGATIVE
COLOR: YELLOW
CREAT SERPL-MCNC: 0.6 MG/DL (ref 0.4–1.2)
CRYSTALS, UA: ABNORMAL
EOSINOPHIL # BLD: 0.8 %
EOSINOPHILS ABSOLUTE: 0.1 THOU/MM3 (ref 0–0.4)
EPITHELIAL CELLS, UA: ABNORMAL /HPF
ERYTHROCYTE [DISTWIDTH] IN BLOOD BY AUTOMATED COUNT: 12.4 % (ref 11.5–14.5)
ERYTHROCYTE [DISTWIDTH] IN BLOOD BY AUTOMATED COUNT: 42 FL (ref 35–45)
ETHYL ALCOHOL, SERUM: < 0.01 %
GFR SERPL CREATININE-BSD FRML MDRD: > 90 ML/MIN/1.73M2
GLUCOSE BLD-MCNC: 97 MG/DL (ref 70–108)
GLUCOSE URINE: NEGATIVE MG/DL
HCT VFR BLD CALC: 39.8 % (ref 37–47)
HEMOGLOBIN: 13.5 GM/DL (ref 12–16)
IMMATURE GRANS (ABS): 0.02 THOU/MM3 (ref 0–0.07)
IMMATURE GRANULOCYTES: 0.3 %
KETONES, URINE: NEGATIVE
LEUKOCYTE ESTERASE, URINE: NEGATIVE
LYMPHOCYTES # BLD: 29.1 %
LYMPHOCYTES ABSOLUTE: 2.1 THOU/MM3 (ref 1–4.8)
MCH RBC QN AUTO: 31.6 PG (ref 26–33)
MCHC RBC AUTO-ENTMCNC: 33.9 GM/DL (ref 32.2–35.5)
MCV RBC AUTO: 93.2 FL (ref 81–99)
MISCELLANEOUS 2: ABNORMAL
MONOCYTES # BLD: 6.1 %
MONOCYTES ABSOLUTE: 0.4 THOU/MM3 (ref 0.4–1.3)
NITRITE, URINE: NEGATIVE
NUCLEATED RED BLOOD CELLS: 0 /100 WBC
OPIATES, URINE: NEGATIVE
OSMOLALITY CALCULATION: 278.8 MOSMOL/KG (ref 275–300)
OXYCODONE: NEGATIVE
PH UA: 7 (ref 5–9)
PHENCYCLIDINE QUANTITATIVE URINE: NEGATIVE
PLATELET # BLD: 276 THOU/MM3 (ref 130–400)
PMV BLD AUTO: 10.3 FL (ref 9.4–12.4)
POTASSIUM SERPL-SCNC: 3.4 MEQ/L (ref 3.5–5.2)
PREGNANCY, SERUM: NEGATIVE
PROTEIN UA: NEGATIVE
RBC # BLD: 4.27 MILL/MM3 (ref 4.2–5.4)
RBC URINE: ABNORMAL /HPF
RENAL EPITHELIAL, UA: ABNORMAL
SALICYLATE, SERUM: < 0.3 MG/DL (ref 2–10)
SEG NEUTROPHILS: 63.2 %
SEGMENTED NEUTROPHILS ABSOLUTE COUNT: 4.6 THOU/MM3 (ref 1.8–7.7)
SODIUM BLD-SCNC: 141 MEQ/L (ref 135–145)
SPECIFIC GRAVITY, URINE: 1.02 (ref 1–1.03)
TOTAL PROTEIN: 6.3 G/DL (ref 6.1–8)
TSH SERPL DL<=0.05 MIU/L-ACNC: 1.36 UIU/ML (ref 0.4–4.2)
UROBILINOGEN, URINE: 1 EU/DL (ref 0–1)
WBC # BLD: 7.3 THOU/MM3 (ref 4.8–10.8)
WBC UA: ABNORMAL /HPF
YEAST: ABNORMAL

## 2020-07-06 PROCEDURE — 6370000000 HC RX 637 (ALT 250 FOR IP): Performed by: PSYCHIATRY & NEUROLOGY

## 2020-07-06 PROCEDURE — 80053 COMPREHEN METABOLIC PANEL: CPT

## 2020-07-06 PROCEDURE — 87086 URINE CULTURE/COLONY COUNT: CPT

## 2020-07-06 PROCEDURE — 36415 COLL VENOUS BLD VENIPUNCTURE: CPT

## 2020-07-06 PROCEDURE — 81001 URINALYSIS AUTO W/SCOPE: CPT

## 2020-07-06 PROCEDURE — 80307 DRUG TEST PRSMV CHEM ANLYZR: CPT

## 2020-07-06 PROCEDURE — 1240000000 HC EMOTIONAL WELLNESS R&B

## 2020-07-06 PROCEDURE — G0480 DRUG TEST DEF 1-7 CLASSES: HCPCS

## 2020-07-06 PROCEDURE — 84703 CHORIONIC GONADOTROPIN ASSAY: CPT

## 2020-07-06 PROCEDURE — 84443 ASSAY THYROID STIM HORMONE: CPT

## 2020-07-06 PROCEDURE — 82248 BILIRUBIN DIRECT: CPT

## 2020-07-06 PROCEDURE — 85025 COMPLETE CBC W/AUTO DIFF WBC: CPT

## 2020-07-06 PROCEDURE — 99285 EMERGENCY DEPT VISIT HI MDM: CPT

## 2020-07-06 RX ORDER — ACETAMINOPHEN 325 MG/1
650 TABLET ORAL EVERY 4 HOURS PRN
Status: DISCONTINUED | OUTPATIENT
Start: 2020-07-06 | End: 2020-07-09 | Stop reason: HOSPADM

## 2020-07-06 RX ORDER — TRAZODONE HYDROCHLORIDE 50 MG/1
50 TABLET ORAL NIGHTLY PRN
Status: DISCONTINUED | OUTPATIENT
Start: 2020-07-06 | End: 2020-07-09 | Stop reason: HOSPADM

## 2020-07-06 RX ORDER — MAGNESIUM HYDROXIDE/ALUMINUM HYDROXICE/SIMETHICONE 120; 1200; 1200 MG/30ML; MG/30ML; MG/30ML
30 SUSPENSION ORAL EVERY 6 HOURS PRN
Status: DISCONTINUED | OUTPATIENT
Start: 2020-07-06 | End: 2020-07-09 | Stop reason: HOSPADM

## 2020-07-06 RX ORDER — NICOTINE 21 MG/24HR
1 PATCH, TRANSDERMAL 24 HOURS TRANSDERMAL DAILY
Status: DISCONTINUED | OUTPATIENT
Start: 2020-07-07 | End: 2020-07-06

## 2020-07-06 RX ORDER — ARIPIPRAZOLE 10 MG/1
5 TABLET ORAL DAILY
Status: ON HOLD | COMMUNITY
End: 2020-07-09 | Stop reason: HOSPADM

## 2020-07-06 RX ORDER — HYDROXYZINE HYDROCHLORIDE 25 MG/1
50 TABLET, FILM COATED ORAL 3 TIMES DAILY PRN
Status: DISCONTINUED | OUTPATIENT
Start: 2020-07-06 | End: 2020-07-09 | Stop reason: HOSPADM

## 2020-07-06 RX ORDER — IBUPROFEN 400 MG/1
400 TABLET ORAL EVERY 6 HOURS PRN
Status: DISCONTINUED | OUTPATIENT
Start: 2020-07-06 | End: 2020-07-09 | Stop reason: HOSPADM

## 2020-07-06 RX ADMIN — HYDROXYZINE HYDROCHLORIDE 50 MG: 25 TABLET ORAL at 22:54

## 2020-07-06 RX ADMIN — TRAZODONE HYDROCHLORIDE 50 MG: 50 TABLET ORAL at 22:54

## 2020-07-06 ASSESSMENT — SLEEP AND FATIGUE QUESTIONNAIRES
DIFFICULTY ARISING: NO
DO YOU HAVE DIFFICULTY SLEEPING: YES
SLEEP PATTERN: DIFFICULTY FALLING ASLEEP;DISTURBED/INTERRUPTED SLEEP;RESTLESSNESS
RESTFUL SLEEP: NO
DIFFICULTY FALLING ASLEEP: YES
AVERAGE NUMBER OF SLEEP HOURS: 6
DIFFICULTY STAYING ASLEEP: YES
DO YOU USE A SLEEP AID: NO

## 2020-07-06 ASSESSMENT — PAIN SCALES - GENERAL: PAINLEVEL_OUTOF10: 0

## 2020-07-06 ASSESSMENT — PATIENT HEALTH QUESTIONNAIRE - PHQ9: SUM OF ALL RESPONSES TO PHQ QUESTIONS 1-9: 20

## 2020-07-06 ASSESSMENT — LIFESTYLE VARIABLES: HISTORY_ALCOHOL_USE: NO

## 2020-07-06 NOTE — PROGRESS NOTES
Provisional Diagnosis:  Major Depressive Disorder, Severe, Recurrent, R/O Bipolar 1 Disorder, R/O Borderline Personality Disorder     Risk, Psychosocial and Contextual Factors:  Binge Drinking, Mood Swings,     Current MH Treatment: Anu Newman CNP ([facility]), Dr. Pilar Rodas (Therapy - [facility])      Present Suicidal Behavior:      Verbal: XXX        Attempt: Denies    Access to Weapons:  Denies    Current Suicide Risk: Low, Moderate or High:  High    Past Suicidal Behavior:       Verbal: XXX    Attempt: XXX    Self-Injurious/Self-Mutilation: XXX - 1 year ago    Traumatic Event Within Past 2 Weeks:  Denies     Current Abuse:  Alcohol     Legal: None    Violence: None    Protective Factors:  Positive Support, Medication Compliance, Outpatient Provider, Housing     Housing:    Lives with her parents in [place], [place] - (back and forth between mother and father)    CPAP/Oxygen/Ambulation Difficulties:  None    Basic Vital Signs Normal?: Check with Patients Nurse prior to Calling Psychiatry    Critical Labs?: Check with Patients Nurse prior to Calling Psychiatry    Clinical Summary:      Patient is a 23year old, female who presents to the ED voluntarily reporting increased suicidal ideation for the past several weeks, reports initially having no plan but when getting in the car to come to the hospital she thought about walking into traffic. Denies a plan currently. Patient reports she was recently diagnosed with Bipolar Disorder, and is struggling to control her emotions. Patient states \"I am tired of fighting, I have no genuine emotions, when I do feel anything it's only because I am manic, I am exhausted from trying to continue my life. \" Patient reports that she feels very depressed, she has been binge drinking for the past 3-4 days to the point of vomiting, she reports having at least 10 drinks/night.  Patient states \"drinking is the only way I can feel anything but it is what triggers my spiral.\" Patient is currently linked with Whitney Ramirez CNP at [facility] for psychiatric management (last appointment 6/23/20, she was new patient to provider), she is currently seeing Armani Mcbride, [facility] for individual counseling. Patient reports that she is medication compliant, she is currently taking Prozac, Abilify, Lamictal and Hydroxyzine. She reports she is unsure if they are working well as she has been drinking excessively recently. Patient reports using marijuana, UDS reflects this. Patient also has history of using shrooms, acid and lowell, she reports that at one point these drugs were somewhat helpful to her as they \"reset her brain\". Patient reports that she did get her job back after being laid off due to [employer], she is working part time at [employer]. Patient is labile throughout assessment, crying then laughing inappropriately. Insight and judgement is poor. Denies homicidal ideations, no A/V hallucinations/delusions noted at this time. Level of Care Disposition:      17:10: Consulted with medical provider. Patient is medically clear. 1712: Consulted with Dr. Samson Cook. Patient to be admitted to to .     1724: Voluntary signed. 20:10: Spoke to Campbell Mckeon RN, patient will go to bed 58-A, will be ready for patient in about 5 minutes.      ELSA Arce

## 2020-07-06 NOTE — ED NOTES
Patient placed in safe room that is ligature resistant with continuous monitoring in place. Provider notified, requested an assessment by behavioral health . Patient belongings secured in a locked lockers outside of the room. Explained suicide prevention precautions to the patient including constant observer. Pt presents to the ED with suicidal ideations. Pt is, at this time, voluntary. Pt was tearful during triage. Pt states she is bipolar, has severe depression and anxiety. Pt denies homicidal ideations, hearing things or hallucinating states she 'just has self hate\". Pt states she feels like she's stuck and she can't control her own emotions. Pt states prior to coming here she was laughing and crying at the same time and she felt \"crazy\". Pt states she \"missing being in control of her own feelings\". Pt attempted suicide multiple times between the ages of [age]. Last time she self harmed was a year ago. Pt states she just wants to learn how to control and deal with her emotions more. Pt provided urine specimen. Denies any needs at this time. ALEJANDRA at bedside. [place] security monitoring, will continue to monitor.       Alecia Cornelius  07/06/20 [id number]

## 2020-07-06 NOTE — ED NOTES
Pt in bed side rails up x2. Denies any needs at this time. [place] security monitoring, will continue to monitor.       Aissatou Smith  07/06/20 3907

## 2020-07-06 NOTE — ED NOTES
Pt in bed, side rails up x2. Denies any needs at this time Pt states she is \"tired and anxious at the same time, I can't get my brain to stop thinking about so many [employer] monitoring, will continue to monitor.      Constanza Whitfield  07/06/20 1640

## 2020-07-06 NOTE — ED NOTES
RN went and introduced herself to pt at this time. Pt denies any physical pain. Denies any needs. Call light in reach.  Will continue to monitor      Yusra Saldana RN  07/06/20 1333

## 2020-07-07 LAB
ORGANISM: ABNORMAL
URINE CULTURE REFLEX: ABNORMAL

## 2020-07-07 PROCEDURE — 1240000000 HC EMOTIONAL WELLNESS R&B

## 2020-07-07 PROCEDURE — 99223 1ST HOSP IP/OBS HIGH 75: CPT | Performed by: PSYCHIATRY & NEUROLOGY

## 2020-07-07 PROCEDURE — 6370000000 HC RX 637 (ALT 250 FOR IP): Performed by: STUDENT IN AN ORGANIZED HEALTH CARE EDUCATION/TRAINING PROGRAM

## 2020-07-07 RX ORDER — LAMOTRIGINE 25 MG/1
25 TABLET ORAL DAILY
Status: DISCONTINUED | OUTPATIENT
Start: 2020-07-07 | End: 2020-07-09 | Stop reason: HOSPADM

## 2020-07-07 RX ORDER — HYDROXYZINE 50 MG/1
50 TABLET, FILM COATED ORAL 3 TIMES DAILY PRN
COMMUNITY

## 2020-07-07 RX ORDER — ARIPIPRAZOLE 10 MG/1
10 TABLET ORAL DAILY
Status: DISCONTINUED | OUTPATIENT
Start: 2020-07-07 | End: 2020-07-08

## 2020-07-07 RX ORDER — FLUOXETINE HYDROCHLORIDE 20 MG/1
20 CAPSULE ORAL DAILY
Status: DISCONTINUED | OUTPATIENT
Start: 2020-07-07 | End: 2020-07-09 | Stop reason: HOSPADM

## 2020-07-07 RX ADMIN — LAMOTRIGINE 25 MG: 25 TABLET ORAL at 14:15

## 2020-07-07 RX ADMIN — FLUOXETINE HYDROCHLORIDE 20 MG: 20 CAPSULE ORAL at 14:15

## 2020-07-07 RX ADMIN — ARIPIPRAZOLE 10 MG: 10 TABLET ORAL at 14:15

## 2020-07-07 ASSESSMENT — LIFESTYLE VARIABLES: HISTORY_ALCOHOL_USE: NO

## 2020-07-07 ASSESSMENT — PATIENT HEALTH QUESTIONNAIRE - PHQ9: SUM OF ALL RESPONSES TO PHQ QUESTIONS 1-9: 20

## 2020-07-07 ASSESSMENT — SLEEP AND FATIGUE QUESTIONNAIRES
DIFFICULTY STAYING ASLEEP: YES
SLEEP PATTERN: DIFFICULTY FALLING ASLEEP;DISTURBED/INTERRUPTED SLEEP;RESTLESSNESS
RESTFUL SLEEP: NO
AVERAGE NUMBER OF SLEEP HOURS: 6
DO YOU USE A SLEEP AID: NO
DO YOU HAVE DIFFICULTY SLEEPING: YES
DIFFICULTY ARISING: NO
DIFFICULTY FALLING ASLEEP: YES

## 2020-07-07 ASSESSMENT — PAIN SCALES - GENERAL
PAINLEVEL_OUTOF10: 0
PAINLEVEL_OUTOF10: 0

## 2020-07-07 NOTE — H&P
Department of Psychiatry  Attending Physician Psychiatric Assessment     Reason for Admission to Psychiatric Unit:  Threat to self requiring 24 hour professional observation    CHIEF COMPLAINT:  Suicidal Ideation    History obtained from:  patient, electronic medical record and family members    HISTORY OF PRESENT ILLNESS:    Chase Loera is a 23 y.o. female with significant past medical history of MDD without psychosis (most recent admission 5/2020), Bipolar unspecfiied, and cholelithiasis, who presented to the ED on 7/6 with suicidal ideation. Patient states that she transitioned yesterday from a manic phase into depression. At that time, she began to have suicidal ideation and voluntarily went to the ED. Since her discharge in May 2020 from our care, she has followed up once with a psychiatrist at [address] in [place], [place]. At that time, she described symptoms of beryl to the staff there. She was started on Abilify 5 mg PO QD, Lamictal 25 mg PO QD, and her Prozac was increased to 40 mg PO QD. Since then, she continues to have these instances and/or days of beryl, followed by a numb and depressed state. She describes her beryl as excessive drinking (10 drinks per night, 3-4 days of the week, for the last month) as well as hypersexuality and recognizes this behavior is extremely out of character for her. She states her first episode of beryl was in March 2020, following her break-up. During these times, she feels she sleeps about 4 hours a night and has excessive energy. Once the beryl ends, she feels very depressed, numb, and overwhelmed by feelings of worthlessness. It is during these times that she has suicidal ideation. For example she imagines walking out in front of a car that she sees driving by. She does realize that these thoughts are inappropriate when they occur and is able to ask for help getting to the emergency department for evaluation.   She is not sure if her medications prescribed in June at [facility] are helping or not due to her excess drinking. She did well overnight and was able to sleep on and off. She feels her depression has gone from 10/10 while in the ED --> to 5/10 on the unit. She does continue to feel numb and depressed. She does not feel anxious at this time. She is eating well. She is looking forward to attending group. She has no other complaints at this time. PSYCHIATRIC HISTORY:  yes - MDD without psychosis, bipolar unspecified  Currently follows with [address] in [place], [place]  2 lifetime suicide attempts  2 psychiatric hospital admissions    Past psychiatric medications includes: Prozac, Lamictal, Abilify    Adverse reactions from psychotropic medications: no    Lifetime Psychiatric Review of Systems         Sharonda or Hypomania: Sharonda - beginning March 2020     Panic Attacks: denies      Phobias: denies     Obsessions and Compulsions:denies     Body or Vocal Tics:  denies     Hallucinations:denies     Delusions: Denies paranoid/grandiose/erotomania/persecutory/bizarre/non bizarre/mood congruent/ mood incongruent    Past Medical History:        Diagnosis Date    Gallbladder & bile duct stone with obstruction     Psychiatric problem        Past Surgical History:        Procedure Laterality Date    TONSILLECTOMY         Allergies:  Patient has no known allergies. Social History:     LEVEL OF EDUCATION: Cosmetology trade school  MARITAL STATUS: Not .  CHILDREN: None  OCCUPATION: [employer]  RESIDENCE: Currently lives with parents      DRUG USE HISTORY  Social History     Tobacco Use   Smoking Status Never Smoker   Smokeless Tobacco Never Used     Social History     Substance and Sexual Activity   Alcohol Use Yes    Comment: 10 drinks in a night     Social History     Substance and Sexual Activity   Drug Use Yes    Frequency: 3.0 times per week    Types: Marijuana       LEGAL HISTORY:   HISTORY OF INCARCERATION: no     Family History:       Problem Relation Age of Onset    Bipolar Disorder Father     Diabetes Paternal Grandmother     OCD Paternal Grandmother     Alcohol Abuse Paternal Grandfather        Psychiatric Family History  DENIES suicides in family  Yes substance use in family    PHYSICAL EXAM:  Vitals:  /86   Pulse 84   Temp 96.6 °F (35.9 °C) (Tympanic)   Resp 16   Ht 5' 2\" (1.575 m)   Wt 145 lb (65.8 kg)   LMP 06/26/2020 (Exact Date)   SpO2 98%   BMI 26.52 kg/m²      Review of Systems   Constitutional: Negative for chills and weight loss. HENT: Negative for ear pain and nosebleeds. Eyes: Negative for blurred vision and photophobia. Respiratory: Negative for cough, shortness of breath and wheezing. Cardiovascular: Negative for chest pain and palpitations. Gastrointestinal: Negative for abdominal pain, diarrhea and vomiting. Genitourinary: Negative for dysuria and urgency. Musculoskeletal: Negative for falls and joint pain. Skin: Negative for itching and rash. Neurological: Negative for tremors, seizures and weakness. Endo/Heme/Allergies: Does not bruise/bleed easily. Physical Exam:      Constitutional:  Appears well-developed and well-nourished, no acute distress  HENT:   Head: Normocephalic and atraumatic. Eyes: Conjunctivae are normal. Right eye exhibits no discharge. Left eye exhibits no discharge. No scleral icterus. Neck: Normal range of motion. Neck supple. Pulmonary/Chest:  No respiratory distress or accessory muscle use, no wheezing. Abdominal: Soft. Exhibits no distension. Musculoskeletal: Normal range of motion. Exhibits no edema. Neurological: cranial nerves II-XII grossly in tact, normal gait and station  Skin: Skin is warm and dry. Patient is not diaphoretic. No erythema.          Mental Status Examination:  Level of consciousness:  within normal limits and awake  Appearance:  well-appearing, in chair, fair grooming and good hygiene  Behavior/Motor:  no and treatment plan with the following updates. Patient was evaluated by Dr Jessica Santo on the unit and I evaluated the patient as tele visit. Patient is a 47-year-old single  female with history of bipolar disorder admitted for worsening suicidal thoughts with a plan to kill self by walking into traffic. Patient reports she has been feeling down sad and low for more days than not for last couple months now. Endorses anhedonia. Patient reports that she followed up with a psychiatrist at [address] [place] [address] in [facility] and was diagnosed with bipolar disorder. Patient reports going through hypomanic episodes during which she participates in impulsive visual activities, binge drinking staying up for few days. Patient reports that she has increased energy during those. After which she crashes for a few days. Patient was started on Abilify 5 mg Lamictal 25 mg and her Prozac was increased to 40 mg daily. Patient has strong history of bipolar disorder and family. She might be going to rapid cycling due to unchallenged antidepressant affect and for optimization of the mood stabilizer. Discussed with her about optimizing Abilify to 10 mg daily to begin with. She understood and agreed to the plan. Patient reports that she has been drinking all day every day till she blacks out for last couple months now. Reports withdrawal symptoms as mild restlessness and insomnia. Will have Librium available 3 times daily as needed for any withdrawal symptoms. Case discussed with staff and treatment team this morning. Rhonda Nunes is a 23 y.o. female being evaluated by a Virtual Visit (video visit) encounter to address concerns as mentioned above. A caregiver was present in the room along with the patient.  Pursuant to the emergency declaration under the [facility], [address] [place] [address] authority and the [employer] Act, this Virtual Visit was conducted with patient's (and/or legal guardian's) consent, to reduce the patient's risk of exposure to COVID-19 and provide necessary medical care. Services were provided through a video synchronous discussion virtually to substitute for in-person visit by provider. Patient is present at [address] [place] [address] on unit 4E and I am physically present at my home in [facility]     --Marybeth Garcia MD on 7/7/2020 at 3:13 PM    An electronic signature was used to authenticate this note. **This report has been created using voice recognition software. It may contain minor errors which are inherent in voice recognition technology. **

## 2020-07-07 NOTE — PLAN OF CARE
Patient has attended at least one group today and has been out of her room at times for social interaction so she is working toward her socialization goal for this shift. Patient will be encouraged to attend all groups on the unit daily during her hospital stay.

## 2020-07-07 NOTE — PROGRESS NOTES
pillow)     Admission order obtained 7/6/2020. Valuables sent home with Not applicable. Valuables placed in safe in security envelope, number:  [id number]. Patient's home medications were Locked in the narcotic cabinet,envelope # [id number]. Patient oriented to surroundings and program expectations and copy of patient rights given. Received admission packet:  Yes. Consents reviewed, signed Yes. \"An Important Message from [facility] About Your Rights\" form reviewed, signed Not applicable. Refused  No. Patient verbalize understanding:  Yes. Patient education on precautions: Yes           Patient screened positive for suicide risk on CSSR-S (\"yes\" to question #4, 5, OR 6)  Yes. Physician notified of risk score. Orders received 15 minute close observation  Explained patients right to have family, representative or physician notified of their admission. Patient has Declined for physician to be notified. Patient has Declined for family/representative to be notified. Provided pt with [employer] handout entitled \"Quitting Smoking. \"  Reviewed handout with pt addressing dangers of smoking, developing coping skills, and providing basic information about quitting. Pt response to counseling:  Patient is a non-smoker. This is a 23year old -female admitted to the Adult Behavioral Service Department for treatment of Unspecified Bipolar Disorder accompanied to the unit per ED staff personnel and a  via wheelchair. It was reported that the patient presented to the ED with suicidal ideations. Patient reported that her depressive symptoms have worsened since her last admission to  in May. The patient reports that she has been drinking along with taking her medications thinking it would make her feel better although it just made things worse for her. The patient reports that her sleep is interrupted and restless. The patient reports that she feels sad all of the time.  The patient was tearful during the admission assessment. Patient given much encouragement and reassurance. The patient denies suicidal thoughts at this time. The patient does contract verbally for safety.           Jean Carlos Eagle RN

## 2020-07-07 NOTE — PROGRESS NOTES
This RN has reviewed and agrees with Gladys Choudhary LPN's data collection and has collaborated with this LPN regarding the patient's care plan.

## 2020-07-07 NOTE — BH NOTE
PLAN OF CARE:     Start Time: 0900  End Time:   0930    Group Topic:  Daily Goals    Group Type:   Goal Group    Intervention/Goal:  To increase support and identify daily goals    Attendance:   Attended group    Affect: blunt    Behavior: cooperative but guarded    Response:  appropriate    Daily Goal:  To not feel numb.      Progress:   Progressing to goal

## 2020-07-07 NOTE — PATIENT CARE CONFERENCE
[facility]  Initial Interdisciplinary Treatment Plan NOTE    Review Date & Time: 7/4/20     Patient was in treatment team.  See Multidisciplinary Treatment Team sheet for participants. Admission Type:   Admission Type: Voluntary    Reason for admission:  Reason for Admission: \"Because I have been very unstable\"      Estimated Length of Stay Update: 3-5 days  Estimated Discharge Date Update: 3-5 days    PATIENT STRENGTHS:  Patient Strengths Strengths: Connection to output provider, Positive Support, No significant Physical Illness  Patient Strengths and Limitations:Limitations: General negative or hopeless attitude about future/recovery, Tendency to isolate self  Addictive Behavior:Addictive Behavior  In the past 3 months, have you felt or has someone told you that you have a problem with:  : None  Do you have a history of Chemical Use?: No  Do you have a history of Alcohol Use?: No  Do you have a history of Street Drug Abuse?: Yes  Histroy of Prescripton Drug Abuse?: No  Medical Problems:  Past Medical History:   Diagnosis Date    Gallbladder & bile duct stone with obstruction     Psychiatric problem        EDUCATION:   Learner Progress Toward Treatment Goals: Reviewed results and recommendations of this team, Reviewed group plan and strategies, Reviewed signs, symptoms and risk of self harm and violent behavior and Reviewed goals and plan of care    Method: Individual    Outcome: Verbalized understanding and Needs reinforcement    PATIENT GOALS: Improve Coping Skills, Medication Management, Improve Mood    PLAN/TREATMENT RECOMMENDATIONS UPDATE:   1. What is the most important thing we can help you with while you are here? - See above  2. Who is your support system? - Family, ex-boyfriend  3. Do you have follow-up providers? - Yes  4. Do you have the ability to pay for your medications? - Yes  5. Where will you be residing when you leave the hospital?  - Yes  6.  Will need a return to work slip or FMLA paper completion?  - Work note      GOALS UPDATE:   Time frame for Short-Term Goals: Daily    ELSA Mims

## 2020-07-07 NOTE — PLAN OF CARE
Problem: Depressive Behavior With or Without Suicide Precautions:  Goal: Ability to disclose and discuss suicidal ideas will improve  Description: Ability to disclose and discuss suicidal ideas will improve  Outcome: Met This Shift  Note: Patient denies suicidal ideations  Goal: Able to verbalize support systems  Description: Able to verbalize support systems  Outcome: Met This Shift  Note: Patient verbalizes she has her family and friends are her support  Goal: Absence of self-harm  Description: Absence of self-harm  Outcome: Met This Shift  Note: No self harm     Problem: Substance Abuse:  Goal: Participates in care planning  Description: Participates in care planning  Outcome: Met This Shift  Note: Patient participates in care planning     Problem: Depressive Behavior With or Without Suicide Precautions:  Goal: Able to verbalize acceptance of life and situations over which he or she has no control  Description: Able to verbalize acceptance of life and situations over which he or she has no control  Outcome: Ongoing  Note: Patient is working towards acceptance of life and situations which she has no control  Goal: Able to verbalize and/or display a decrease in depressive symptoms  Description: Able to verbalize and/or display a decrease in depressive symptoms  Outcome: Ongoing  Note: Patient verbalizes depressive symptoms, rates mood #5, has flat,sad,,depressed affect, will continue to monitor  Goal: Patient specific goal  Description: Patient specific goal  Outcome: Ongoing  Note: Goal:  go to groups     Problem: Anxiety:  Goal: Level of anxiety will decrease  Description: Level of anxiety will decrease  Outcome: Ongoing  Note: Patient verbalizes having little anxiety at this time, does not want medications for it at this time     Problem:  Activity:  Goal: Sleeping patterns will improve  Description: Sleeping patterns will improve  Outcome: Ongoing  Note: Patient slept 7 hors during the night, will monitor Problem: Discharge Planning:  Goal: Discharged to appropriate level of care  Description: Discharged to appropriate level of care  Outcome: Ongoing  Note: Discharge planning to be discussed     Problem: KNOWLEDGE DEFICIT,EDUCATION,DISCHARGE PLAN  Goal: Knowledge - personal safety  Outcome: Ongoing  Note: Safety plan yet to be completed     Problem: Suicide risk  Goal: Provide patient with safe environment  Description: Provide patient with safe environment  7/6/2020 2219 by Jose Preston RN  Outcome: Completed       Care plan reviewed with patient . Patient  verbalizes understanding of the plan of care and contributes to goal setting.

## 2020-07-07 NOTE — BH NOTE
INPATIENT RECREATIONAL THERAPY  ADULT BEHAVIORAL SERVICES  EVALUATION    REFERRING PHYSICIAN:    Dr. Ann Tucker  DIAGNOSIS:   Bipolar Disorder, Unspecified  PRECAUTIONS:   Standard precautions    HISTORY OF PRESENT ILLNESS/INJURY:   Patient was admitted to the unit due to suicidal ideation and depression. Patient stated that she has thoughts of walking out in front of a car prior to admission. Patient reported stress due to feeling like she is \"unstable. \" Patient stated that she has a break-up with her boyfriend in March of 2020, had a manic episode and then became depressed. Patient reported that she has also been abusing alcohol prior to admission. Patient stated that she has been compliant with her medications but does not think that they are working well. Patient was cooperative but guarded at time of evaluation. PMH:  Please see medical chart for prior medical history, allergies, and medication    HISTORY OF PSYCHIATRIC TREATMENT:  IP:    [facility]  OP:  Currently with [facility] and Dr. Scarlett Castrejon as an adolescent. DATE OF BIRTH:   [birth year]  GENDER:   female  MARITAL STATUS:    single  EMPLOYMENT STATUS:   unemployed    LIVING SITUATION/SUPPORT:  Lives with a friend. EDUCATIONAL LEVEL:   HS graduate with vocational training. MEDICATION/DRUG USE:  History of substance abuse. Cocaine and marijuana. Alcohol abuse. History of medication noncompliance. LEISURE INTERESTS:   Playing the piano, listening to music, writing poetry, journaling/writing, drawing, family activities, activities with friends, watching TV/Movies, outdoor activities, spiritual activities  ACTIVITY PREFERENCE:  Small group  ACTIVITY TYPES:   Passive. Indoor. Outdoor. Active. COGNITION:   A&Ox4    COPING:    poor  ATTENTION:  Poor concentration  RELAXATION:  Patient has poor sleep, anxiety and panic attacks.   SELF-ESTEEM:  poor  MOTIVATION:   fair    SOCIAL SKILLS:   Good but guarded  FRUSTRATION TOLERANCE:   Patient has no history of violence. ATTENTION SEEKING:   None noted  COOPERATION:   Cooperative but guarded  AFFECT:   blunt  APPEARANCE:   appropriate      HEARING:   No problems noted  VISION:  No problems noted  VERBAL COMMUNICATION:   No problems  WRITTEN COMMUNICATION:   No problems    COORDINATION:   No problems    MOBILITY:  Ambulates independently    GOALS:   Increase socialization by attending groups on the unit and coming out of her room for social interaction daily.

## 2020-07-07 NOTE — PROGRESS NOTES
Behavioral Health   Admission Note     Admission Type:   Admission Type: Voluntary    Reason for admission:  Reason for Admission: \"Because I have been very unstable\"    PATIENT STRENGTHS:  Strengths: Connection to output provider, Positive Support, No significant Physical Illness    Patient Strengths and Limitations:  Limitations: General negative or hopeless attitude about future/recovery, Tendency to isolate self    Addictive Behavior:   Addictive Behavior  In the past 3 months, have you felt or has someone told you that you have a problem with:  : None  Do you have a history of Chemical Use?: No  Do you have a history of Alcohol Use?: No  Do you have a history of Street Drug Abuse?: Yes  Histroy of Prescripton Drug Abuse?: No    Medical Problems:   Past Medical History:   Diagnosis Date    Gallbladder & bile duct stone with obstruction     Psychiatric problem        Status EXAM:  Status and Exam  Normal: No  Facial Expression: Sad, Expressionless  Affect: Blunt  Level of Consciousness: Alert  Mood:Normal: No  Mood: Depressed, Anxious, Sad, Helpless  Motor Activity:Normal: Yes  Interview Behavior: Cooperative  Preception: [place] to Person, [place] to Time, [place] to Place, [place] to Situation  Attention:Normal: No  Attention: Distractible  Thought Processes: Circumstantial  Thought Content:Normal: Yes  Hallucinations: None  Delusions: No  Memory:Normal: No  Memory: Poor Recent  Insight and Judgment: Yes  Present Suicidal Ideation: No  Present Homicidal Ideation: No    Pt admitted with followings belongings:        Admission order obtained ***. Valuables sent home with***. Valuables placed in safe in security envelope, number:  ***. Patient's home medications were ***. Patient oriented to surroundings and program expectations and copy of patient rights given. Received admission packet:  ***. Consents reviewed, signed ***. \"An Important Message from [employer] About Your Rights\" form reviewed, signed ***.  Refused ***. Patient verbalize understanding:  ***. Patient education on precautions: ***           Patient screened positive for suicide risk on CSSR-S (\"yes\" to question #4, 5, OR 6)  ***. Physician notified of risk score. Orders received *** . Explained patients right to have family, representative or physician notified of their admission. Patient has {requested/declined:7169419753} for physician to be notified. Patient has {requested/declined:4169080194} for family/representative to be notified. Provided pt with [employer] handout entitled \"Quitting Smoking. \"  Reviewed handout with pt addressing dangers of smoking, developing coping skills, and providing basic information about quitting.   Pt response to counseling:  ***          Jean Carlos Eagle RN

## 2020-07-08 PROBLEM — F31.81 DEPRESSED BIPOLAR II DISORDER (HCC): Status: ACTIVE | Noted: 2020-07-06

## 2020-07-08 PROCEDURE — 1240000000 HC EMOTIONAL WELLNESS R&B

## 2020-07-08 PROCEDURE — APPSS30 APP SPLIT SHARED TIME 16-30 MINUTES: Performed by: PHYSICIAN ASSISTANT

## 2020-07-08 PROCEDURE — 99232 SBSQ HOSP IP/OBS MODERATE 35: CPT | Performed by: PSYCHIATRY & NEUROLOGY

## 2020-07-08 PROCEDURE — 90833 PSYTX W PT W E/M 30 MIN: CPT | Performed by: PSYCHIATRY & NEUROLOGY

## 2020-07-08 PROCEDURE — 6370000000 HC RX 637 (ALT 250 FOR IP): Performed by: STUDENT IN AN ORGANIZED HEALTH CARE EDUCATION/TRAINING PROGRAM

## 2020-07-08 PROCEDURE — 6370000000 HC RX 637 (ALT 250 FOR IP): Performed by: PSYCHIATRY & NEUROLOGY

## 2020-07-08 RX ORDER — ARIPIPRAZOLE 15 MG/1
15 TABLET ORAL DAILY
Status: DISCONTINUED | OUTPATIENT
Start: 2020-07-09 | End: 2020-07-09 | Stop reason: HOSPADM

## 2020-07-08 RX ADMIN — LAMOTRIGINE 25 MG: 25 TABLET ORAL at 08:03

## 2020-07-08 RX ADMIN — FLUOXETINE HYDROCHLORIDE 20 MG: 20 CAPSULE ORAL at 08:03

## 2020-07-08 RX ADMIN — ARIPIPRAZOLE 10 MG: 10 TABLET ORAL at 08:03

## 2020-07-08 RX ADMIN — TRAZODONE HYDROCHLORIDE 50 MG: 50 TABLET ORAL at 21:26

## 2020-07-08 RX ADMIN — HYDROXYZINE HYDROCHLORIDE 50 MG: 25 TABLET ORAL at 15:42

## 2020-07-08 NOTE — PLAN OF CARE
Problem: Anxiety:  Goal: Level of anxiety will decrease  Description: Level of anxiety will decrease  Outcome: Met This Shift  Note: Patient reports no anxiety. Problem: Depressive Behavior With or Without Suicide Precautions:  Goal: Able to verbalize and/or display a decrease in depressive symptoms  Description: Able to verbalize and/or display a decrease in depressive symptoms  Outcome: Ongoing  Note: Pt affect flat an calm  Goal: Able to verbalize support systems  Description: Able to verbalize support systems  Outcome: Ongoing  Note: Patient reports family and friends as their support system. Goal: Patient specific goal  Description: Patient specific goal  Outcome: Ongoing  Note: Patient reports goal for today is to fel better enough to eventually leave . Problem: Substance Abuse:  Goal: Participates in care planning  Description: Participates in care planning  Outcome: Ongoing  Note: Patient discussed treatment plan with physician/medical staff, attending group, and compliant with medications. Problem: Discharge Planning:  Goal: Discharged to appropriate level of care  Description: Discharged to appropriate level of care  Outcome: Ongoing  Note: Patient voices no  needs before discharge. Patient plans to be discharged to home with parents. Discharge planner working with patient to achieve optimal discharge plans, specific to individual needs. Problem: KNOWLEDGE DEFICIT,EDUCATION,DISCHARGE PLAN  Goal: Knowledge - personal safety  Outcome: Ongoing  Note: Maintained in safe and secure environment.       Problem: Depressive Behavior With or Without Suicide Precautions:  Goal: Ability to disclose and discuss suicidal ideas will improve  Description: Ability to disclose and discuss suicidal ideas will improve  Outcome: Completed  Note: Pt denied any SI at this time  Goal: Absence of self-harm  Description: Absence of self-harm  Outcome: Completed  Note: No self harm behaviors were observed or reported so far this shift. Remains on every 15 minutes precautions for safety. Problem: Activity:  Goal: Sleeping patterns will improve  Description: Sleeping patterns will improve  Outcome: Completed  Note: Patient slept 7.5 hours last night, reports they  slept well. Encourage patient not to take naps during the day, relax several hours before bed and to take prescribed sleep meds as ordered. Patient verbalized understanding.

## 2020-07-08 NOTE — PLAN OF CARE
Patient has attended at least 2 groups this shift and has also been out of her room for socialization with others so she has met her socialization goal for today.

## 2020-07-08 NOTE — PROGRESS NOTES
Department of Psychiatry  Progress Note     Chief Complaint:  Depressed bipolar II disorder ([address]     SUBJECTIVE:    PROGRESS:  Nandini Grady is feeling \"better. Im not as down today. \"  She rates her mood an 8/10 with 10 being the best.  She denies any alcohol withdrawal symptoms. She holds her arms straight out in front of her. No tremoring noted. Denies suicidal ideation today  Continues to feel hopeless and helpless at times about her bipolar disorder but those feelings are improving  She states while on the unit, she wants to learn how to cope with being bipolar because she does not want her mental illness to take over her life. She feels that groups are helping her with that. Suicidal ideations: denies    Compliance with medications: good   Medication side effects: absent  ROS: Patient has new complaints:  no  Sleep quality: 7 hours last night  Attending groups: yes      OBJECTIVE      Medications  Current Facility-Administered Medications: ARIPiprazole (ABILIFY) tablet 10 mg, 10 mg, Oral, Daily  lamoTRIgine (LAMICTAL) tablet 25 mg, 25 mg, Oral, Daily  FLUoxetine (PROZAC) capsule 20 mg, 20 mg, Oral, Daily  acetaminophen (TYLENOL) tablet 650 mg, 650 mg, Oral, Q4H PRN  ibuprofen (ADVIL;MOTRIN) tablet 400 mg, 400 mg, Oral, Q6H PRN  hydrOXYzine (ATARAX) tablet 50 mg, 50 mg, Oral, TID PRN  traZODone (DESYREL) tablet 50 mg, 50 mg, Oral, Nightly PRN  magnesium hydroxide (MILK OF MAGNESIA) 400 MG/5ML suspension 30 mL, 30 mL, Oral, Daily PRN  aluminum & magnesium hydroxide-simethicone (MAALOX) 200-200-20 MG/5ML suspension 30 mL, 30 mL, Oral, Q6H PRN     Physical     height is 5' 2\" (1.575 m) and weight is 145 lb (65.8 kg). Her temperature is 96.2 °F (35.7 °C). Her blood pressure is 120/58 (abnormal) and her pulse is 74. Her respiration is 16 and oxygen saturation is 98%.    Lab Results   Component Value Date    WBC 7.3 07/06/2020    HGB 13.5 07/06/2020    HCT 39.8 07/06/2020     07/06/2020    ALT 10 (L) 07/06/2020 AST 11 07/06/2020     07/06/2020    K 3.4 (L) 07/06/2020     07/06/2020    CREATININE 0.6 07/06/2020    BUN 6 (L) 07/06/2020    CO2 25 07/06/2020    TSH 1.360 07/06/2020          Mental Status Exam:   Level of consciousness:  within normal limits  Appearance:  well-appearing, street clothes and in chair  Behavior/Motor:  no abnormalities noted  Attitude toward examiner:  cooperative, attentive and good eye contact  Speech:  spontaneous, normal rate and normal volume  Mood:  \"better\"  Affect:  mood congruent  Thought processes:  linear, goal directed and coherent  Thought content:  Denies homicidal ideation  Suicidal Ideation:  denies suicidal ideation  Delusions:  no evidence of delusions  Perceptual Disturbance:  denies any perceptual disturbance  Cognition: Patient is oriented to person, place time and situation  Concentration: clinically adequate  Memory: intact  Insight & Judgement: fair       ASSESSMENT     Depressed bipolar II disorder ([facility] [address]     PLAN    Patient's symptoms show some improvement today  Increase Abilify to 15mg daily  Attempt to develop insight, psycho-education and supportive therapy conducted. Probable discharge: TBD  Follow-up: Arun Griffin in [facility] is a 23 y.o. female being evaluated by a Virtual Visit (video visit) encounter to address concerns as mentioned above. A caregiver was present in the room along with the patient. Pursuant to the emergency declaration under the [facility] authority and the [employer] Act, this Virtual Visit was conducted with patient's (and/or legal guardian's) consent, to reduce the patient's risk of exposure to COVID-19 and provide necessary medical care. Services were provided through a video synchronous discussion virtually to substitute for in-person visit by provider.    Patient is present at [address] [place] [address] electronic signature was used to authenticate this note. **This report has been created using voice recognition software. It may contain minor errors which are inherent in voice recognition technology. **

## 2020-07-08 NOTE — BH NOTE
Group Therapy Note    Date: 7/7/2020  Start Time: 2000  End Time:  2020  Number of Participants: 1    Type of Group: Wrap-Up    Wellness Binder Information  Module Name:    Session Number:      Patient's Goal:  Go to groups    Notes:  met    Status After Intervention:  Improved    Participation Level: Active Listener    Participation Quality: Appropriate      Speech:  normal      Thought Process/Content: Logical      Affective Functioning: Congruent      Mood: anxious      Level of consciousness:  Alert      Response to Learning: Able to verbalize current knowledge/experience      Endings: None Reported    Modes of Intervention: Education      Discipline Responsible: Registered Nurse      Signature:   Per Masterson RN

## 2020-07-08 NOTE — PLAN OF CARE
Problem: Depressive Behavior With or Without Suicide Precautions:  Goal: Able to verbalize and/or display a decrease in depressive symptoms  Description: Able to verbalize and/or display a decrease in depressive symptoms  7/7/2020 2205 by Magdalene Loya RN  Outcome: Ongoing  Note: Pt reports mild depression and anxiety that stems from being here  7/7/2020 1101 by Belen Schaeffer LPN  Outcome: Ongoing  Note: Patient verbalizes depressive symptoms, rates mood #5, has flat,sad,,depressed affect, will continue to monitor  Goal: Ability to disclose and discuss suicidal ideas will improve  Description: Ability to disclose and discuss suicidal ideas will improve  7/7/2020 2205 by Magdalene Loya RN  Outcome: Ongoing  Note: Pt denies self harm thoughts  7/7/2020 1101 by Belen Schaeffer LPN  Outcome: Met This Shift  Note: Patient denies suicidal ideations  Goal: Able to verbalize support systems  Description: Able to verbalize support systems  7/7/2020 2205 by Magdalene Loya RN  Outcome: Ongoing  Note: Pt states positive support system  7/7/2020 1101 by Belen Schaeffer LPN  Outcome: Met This Shift  Note: Patient verbalizes she has her family and friends are her support  Goal: Absence of self-harm  Description: Absence of self-harm  7/7/2020 2205 by Magdalene Loya RN  Outcome: Ongoing  Note: Pt denies self harm thoughts  7/7/2020 1101 by Belen Schaeffer LPN  Outcome: Met This Shift  Note: No self harm  Goal: Patient specific goal  Description: Patient specific goal  7/7/2020 2205 by Magdalene Loya RN  Outcome: Ongoing  Note: Pt met goal of attending groups  7/7/2020 1101 by Belen Schaeffer LPN  Outcome: Ongoing  Note: Goal:  go to groups     Problem: Substance Abuse:  Goal: Participates in care planning  Description: Participates in care planning  7/7/2020 2205 by Magdalene Loya RN  Outcome: Ongoing  Note: Pt is taking medications, attending and participating in groups and care planning.  Pt has good interaction with staff and peers   7/7/2020 1101 by Jerry Mccain LPN  Outcome: Met This Shift  Note: Patient participates in care planning     Problem: Anxiety:  Goal: Level of anxiety will decrease  Description: Level of anxiety will decrease  7/7/2020 2205 by Judith Marks RN  Outcome: Ongoing  Note: Pt reports mild depression and anxiety that stems from being here  7/7/2020 1101 by Jerry Mccain LPN  Outcome: Ongoing  Note: Patient verbalizes having little anxiety at this time, does not want medications for it at this time     Problem: Activity:  Goal: Sleeping patterns will improve  Description: Sleeping patterns will improve  7/7/2020 2205 by Judith Marks RN  Outcome: Ongoing  Note: Pt resting quietly with no distress noted  7/7/2020 1101 by Jerry Mccain LPN  Outcome: Ongoing  Note: Patient slept 7 hors during the night, will monitor     Problem: Discharge Planning:  Goal: Discharged to appropriate level of care  Description: Discharged to appropriate level of care  7/7/2020 2205 by Judith Marks RN  Outcome: Ongoing  Note: Pt plans to return home to her parents and follow with Dr. Yemi Mortensen  7/7/2020 1101 by Jerry Mccain LPN  Outcome: Ongoing  Note: Discharge planning to be discussed     Problem: KNOWLEDGE DEFICIT,EDUCATION,DISCHARGE PLAN  Goal: Knowledge - personal safety  7/7/2020 2205 by Judith Marks RN  Outcome: Ongoing  Note: Pt remains safe and free of harm  7/7/2020 1101 by Jerry Mccain LPN  Outcome: Ongoing  Note: Safety plan yet to be completed     Problem: Social interaction  Goal: Promoting Socialization  7/7/2020 1237 by Wu Chaves  Outcome: Ongoing   Care plan reviewed with patient and  verbalize understanding of the plan of care and contribute to goal setting.

## 2020-07-08 NOTE — GROUP NOTE
Group Therapy Note    Date: 7/8/2020    Group Start Time: 1115  Group End Time: 6339  Group Topic: Cognitive Skills    [facility] 4E    Mansoor Israel        Group Therapy Note    Attendees: 10         Patient's Goal:  To participate in the group process and gain support from peers. Notes:  Patient introduced herself to the group and briefly discussed what brought her in to the hospital. Patient participated in the group journal activity and was able to identify a positive coping skill to use for the day. Status After Intervention:  Improved    Participation Level:  Active Listener and Interactive    Participation Quality: Appropriate, Attentive and Sharing      Speech:  normal      Thought Process/Content: Logical      Affective Functioning: Congruent      Mood: euthymic      Level of consciousness:  Alert, Oriented x4 and Attentive      Response to Learning: Able to verbalize current knowledge/experience and Progressing to goal      Endings: None Reported    Modes of Intervention: Education, Support, Socialization and Activity      Discipline Responsible: /Counselor      Signature:  Kenneth Kc, [facility]

## 2020-07-08 NOTE — BH NOTE
Group Therapy Note    Date: 7/8/2020  Start Time:   1000  End Time:     1030  Number of Participants:   10    Type of Group: Recreational/Social    Patient's Goal:    Increase socialization and self-esteem. Notes:    Patient participated in a \"Draw a Tree\" activity. Patient was social with others and demonstrated good concentration.      Status After Intervention:  Improved    Participation Level: Interactive    Participation Quality: Appropriate and Attentive      Speech:  hesitant      Thought Process/Content: Logical      Affective Functioning: Congruent      Mood: euthymic      Level of consciousness:  Oriented x4      Response to Learning: Progressing to goal      Endings: None Reported    Modes of Intervention: Socialization, Exploration and Activity      Discipline Responsible: Psychoeducational Specialist      Signature:  Kelsey Perez

## 2020-07-09 VITALS
SYSTOLIC BLOOD PRESSURE: 111 MMHG | HEIGHT: 62 IN | BODY MASS INDEX: 26.68 KG/M2 | WEIGHT: 145 LBS | OXYGEN SATURATION: 96 % | HEART RATE: 87 BPM | RESPIRATION RATE: 16 BRPM | DIASTOLIC BLOOD PRESSURE: 65 MMHG | TEMPERATURE: 97.1 F

## 2020-07-09 PROCEDURE — 6370000000 HC RX 637 (ALT 250 FOR IP): Performed by: PHYSICIAN ASSISTANT

## 2020-07-09 PROCEDURE — 6370000000 HC RX 637 (ALT 250 FOR IP): Performed by: STUDENT IN AN ORGANIZED HEALTH CARE EDUCATION/TRAINING PROGRAM

## 2020-07-09 PROCEDURE — 99239 HOSP IP/OBS DSCHRG MGMT >30: CPT | Performed by: PSYCHIATRY & NEUROLOGY

## 2020-07-09 RX ORDER — FLUOXETINE HYDROCHLORIDE 20 MG/1
20 CAPSULE ORAL DAILY
Qty: 30 CAPSULE | Refills: 3 | Status: SHIPPED | OUTPATIENT
Start: 2020-07-10

## 2020-07-09 RX ORDER — ARIPIPRAZOLE 15 MG/1
15 TABLET ORAL DAILY
Qty: 30 TABLET | Refills: 0 | Status: SHIPPED | OUTPATIENT
Start: 2020-07-10

## 2020-07-09 RX ORDER — LAMOTRIGINE 25 MG/1
25 TABLET ORAL DAILY
Qty: 30 TABLET | Refills: 0 | Status: SHIPPED | OUTPATIENT
Start: 2020-07-10

## 2020-07-09 RX ADMIN — LAMOTRIGINE 25 MG: 25 TABLET ORAL at 08:26

## 2020-07-09 RX ADMIN — ARIPIPRAZOLE 15 MG: 15 TABLET ORAL at 08:26

## 2020-07-09 RX ADMIN — FLUOXETINE HYDROCHLORIDE 20 MG: 20 CAPSULE ORAL at 08:25

## 2020-07-09 ASSESSMENT — ENCOUNTER SYMPTOMS
SORE THROAT: 0
VOMITING: 0
NAUSEA: 0
SHORTNESS OF BREATH: 0
COUGH: 0
DIARRHEA: 0
CONSTIPATION: 0
RHINORRHEA: 0
ABDOMINAL PAIN: 0
COLOR CHANGE: 0

## 2020-07-09 ASSESSMENT — PAIN SCALES - GENERAL
PAINLEVEL_OUTOF10: 0
PAINLEVEL_OUTOF10: 0

## 2020-07-09 NOTE — PLAN OF CARE
Problem: KNOWLEDGE DEFICIT,EDUCATION,DISCHARGE PLAN  Goal: Knowledge - personal safety  7/9/2020 1001 by Judy Palomares. Suman Daley LPN  Outcome: Completed  Note: 1. Warning signs that happen when I am distressed or experience harmful thoughts Not wanting to interact with others, tearing down my self-esteem. 2.  Activities that I can do to cope in a healthy way when I am stressed or distressed Drawing, writing, photography, cosplay/make-up, piano. 3. List of roadblocks that keep me from using healthy coping skills. Drinking and smoking. 4. List of my support persons Christi Goodpasture, Efrain, Kobe Addison, Nisha Winter, Garcia Randall, Sowmya Reis and Edie.      EMERGENCY CONTACTS:  -National suicide prevention life line [phone]  -24 hour crisis line 1-173-HOPE (1917)  -Domestic violence hotline 8-684-667-SAFE (35 965 187)  -Test CONNECT to 883576 to chat with a trained crisis counselor 24 hours/day 7 days a week  -CALL 911   7/9/2020 0008 by Roger Saul RN  Outcome: Ongoing  Note: Pt remains safe and free of harm

## 2020-07-09 NOTE — ED PROVIDER NOTES
[facility] emergency department encounter      CHIEF COMPLAINT       Chief Complaint   Patient presents with    Suicidal       Nurses Notes reviewed and I agree except asnoted in the HPI. HISTORY OFPRESENT ILLNESS    Tawnya Sosa is a 23 y.o. female who presents to the emergency department for evaluation of suicidal ideation. The patient is here on a voluntary basis. The patient states that she is bipolar and also has severe depression and anxiety. The patient reports that she \"has self-hate\" and has had thoughts of hurting herself. She denies to me any suicidal plan, although she reported to [facility] staff that she had thoughts of driving into traffic while in route to this department. The patient reports that she \"misses being in control of her own feelings\". The patient states that she has attempted suicide multiple times in the past between ages 13 and 10. . She states that the last time she self harmed was a year ago. The patient reports that she wants to learn how to control and deal with her emotions more appropriately. The patient denies any homicidal ideation. The patient denies any fevers, chills, URI symptoms, chest pain, shortness of breath, abdominal pain, nausea, vomiting, diarrhea, or constipation. The patient denies any alcohol or illicit substance use. There are no additional complaints at this time. REVIEW OF SYSTEMS      Review of Systems   Constitutional: Negative for chills, fatigue and fever. HENT: Negative for congestion, ear pain, rhinorrhea and sore throat. Respiratory: Negative for cough and shortness of breath. Cardiovascular: Negative for chest pain. Gastrointestinal: Negative for abdominal pain, constipation, diarrhea, nausea and vomiting. Musculoskeletal: Negative for arthralgias and myalgias. Skin: Negative for color change and pallor. Neurological: Negative for weakness.    Psychiatric/Behavioral: Positive for dysphoric mood and suicidal ideas. Negative for agitation and confusion. The patient is nervous/anxious. PAST MEDICAL HISTORY    has a past medical history of Gallbladder & bile duct stone with obstruction and Psychiatric problem. SURGICAL HISTORY      has a past surgical history that includes Tonsillectomy. CURRENT MEDICATIONS       Current Discharge Medication List      CONTINUE these medications which have NOT CHANGED    Details   hydrOXYzine (ATARAX) 50 MG tablet Take 50 mg by mouth 3 times daily as needed for Anxiety      ARIPiprazole (ABILIFY) 10 MG tablet Take 5 mg by mouth daily       FLUoxetine (PROZAC) 10 MG capsule Take 1 capsule by mouth daily  Qty: 30 capsule, Refills: 0             ALLERGIES     has No Known Allergies. FAMILY HISTORY     She indicated that the status of her father is unknown. She indicated that the status of her paternal grandmother is unknown. She indicated that the status of her paternal grandfather is unknown. [unfilled]    SOCIAL HISTORY      reports that she has never smoked. She has never used smokeless tobacco. She reports current alcohol use. She reports current drug use. Frequency: 3.00 times per week. Drug: Marijuana. PHYSICAL EXAM     INITIAL VITALS:  height is 5' 2\" (1.575 m) and weight is 145 lb (65.8 kg). Her tympanic temperature is 98.9 °F (37.2 °C). Her blood pressure is 109/70 and her pulse is 88. Her respiration is 17 and oxygen saturation is 96%. Physical Exam  Vitals signs and nursing note reviewed. Constitutional:       General: She is not in acute distress. Appearance: Normal appearance. She is well-developed. She is not ill-appearing, toxic-appearing or diaphoretic. HENT:      Head: Normocephalic and atraumatic. Right Ear: External ear normal.      Left Ear: External ear normal.      Nose: Nose normal.      Mouth/Throat:      Mouth: Mucous membranes are moist.      Pharynx: Oropharynx is clear. Eyes:      General: No scleral icterus.         Right eye: absence of a cardiologist.  None    RADIOLOGY: non-plain film images(s) such as CT, Ultrasound and MRI are read by the radiologist.  Plainradiographic images are visualized and preliminarily interpreted by the emergency physician unless otherwise stated below. No orders to display       LABS:   Labs Reviewed   CULTURE, REFLEXED, URINE - Abnormal; Notable for the following components:       Result Value    Urine Culture Reflex   (*)     Value: Mixed growth. The mixture of organisms present represents both organisms that may cause urinary tract infections and organisms that are not a common cause of urinary tract infections and are possibly skin rik or distal urethral rik.      Organism Mixed Growth     (*)     All other components within normal limits    Narrative:     Source: urine, clean catch       Site:           Current Antibiotics: not stated   BASIC METABOLIC PANEL - Abnormal; Notable for the following components:    Potassium 3.4 (*)     BUN 6 (*)     All other components within normal limits   HEPATIC FUNCTION PANEL - Abnormal; Notable for the following components:    ALT 10 (*)     All other components within normal limits   SALICYLATE LEVEL - Abnormal; Notable for the following components:    Salicylate, Serum < 0.3 (*)     All other components within normal limits   URINE WITH REFLEXED MICRO - Abnormal; Notable for the following components:    Character, Urine CLOUDY (*)     All other components within normal limits   CBC WITH AUTO DIFFERENTIAL   HCG, SERUM, QUALITATIVE   TSH WITHOUT REFLEX   ACETAMINOPHEN LEVEL   ETHANOL   URINE DRUG SCREEN   ANION GAP   GLOMERULAR FILTRATION RATE, ESTIMATED   OSMOLALITY       EMERGENCY DEPARTMENT COURSE:   Vitals:    Vitals:    07/07/20 0800 07/07/20 2054 07/08/20 0740 07/09/20 0011   BP: 102/69 112/76 (!) 120/58 109/70   Pulse: 76 85 74 88   Resp: 16 17 16 17   Temp: 97.6 °F (36.4 °C) 96.2 °F (35.7 °C) 96.2 °F (35.7 °C) 98.9 °F (37.2 °C)   TempSrc: Oral Tympanic Tympanic   SpO2: 97% 97% 98% 96%   Weight:       Height:         The patient was seen and evaluated within the ED today with  depression, anxiety, and suicidal ideation. Within the department, I observed the patient's vital signs to be within acceptable range. On exam, the patient is tearful. She laughs at inappropriate times and is labile. Laboratory work was reassuring. UDS is positive for cannabis. I observed the patient's condition to remain stable during the duration of the stay. I explained my proposed course of treatment to the patient, who was amenable to my treatment and admission decisions. Dr. Jane Green, Psychiatry, was consulted and kindly agreed to admit the patient for further evaluation and management. The patient remained stable and maintained stable vital signs until admission to the floor. CRITICAL CARE:   None    CONSULTS:  ALEJANDRA    Dr. Jane Green, Psychiatry - kindly agreed to admit the patient for further evaluation and management    PROCEDURES:  None    FINAL IMPRESSION      1. Current severe episode of major depressive disorder without psychotic features, unspecified whether recurrent ([address]          DISPOSITION/PLAN     1.  Current severe episode of major depressive disorder without psychotic features, unspecified whether recurrent ([address]      Admit under Psychiatric services    PATIENT REFERRED TO:  Unknown Provider Result            DISCHARGE MEDICATIONS:  Current Discharge Medication List          (Please note that portions of this note werecompleted with a voice recognition program.  Efforts were made to edit the dictations but occasionally words are mis-transcribed.)    LANE Sierra PA-C  07/09/20 0206

## 2020-07-09 NOTE — PROGRESS NOTES
Behavioral Health   Discharge Note    Pt discharged with followings belongings:   Dentures: None  Vision - Corrective Lenses: None  Hearing Aid: None  Jewelry: Ring, Earrings, Necklace  Body Piercings Removed: Yes(2 nose rings and 1 tongue ring)  Clothing: Footwear, Pants, Shirt, Undergarments (Comment), Pajamas  Were All Patient Medications Collected?: Yes  Other Valuables: Cell phone, Money (Comment), Wallet   Valuables sent home with patient. Valuables retrieved from safe, Security envelope number:  [id number] and returned to patient. Patient left department with family via private vehicle. Discharged to private residence. Patient education on aftercare instructions: yes  Bridge Appointment completed: Reviewed Discharge Instructions with patient. Patient verbalizes understanding and agreement with the discharge plan using the teachback method. Information faxed to Dr. Radha Pierre by  Patient verbalize understanding of AVS:  yes. Status EXAM upon discharge:  Status and Exam  Normal: Yes  Facial Expression: Brightened  Affect: Appropriate  Level of Consciousness: Alert  Mood:Normal: Yes  Mood: Depressed  Motor Activity:Normal: Yes  Interview Behavior: Cooperative  Preception: Tiff to Person, Essence Flor to Time, Tiff to Place, Tiff to Situation  Attention:Normal: Yes  Attention: Distractible  Thought Processes: Circumstantial  Thought Content:Normal: Yes  Hallucinations: None  Delusions: No  Memory:Normal: Yes  Memory: Poor Recent  Insight and Judgment: Yes  Insight and Judgment: Poor Judgment  Present Suicidal Ideation: No  Present Homicidal Ideation: No    OG Medel

## 2020-07-09 NOTE — PLAN OF CARE
Shift  Note: Patient reports no anxiety. Problem: Discharge Planning:  Goal: Discharged to appropriate level of care  Description: Discharged to appropriate level of care  7/9/2020 0008 by Sid Melendez RN  Outcome: Ongoing  Note: Pt to be discharged home with parents and follow with Dr. Anne-Marie Carpio  7/8/2020 [place] by Ann Novoa RN  Outcome: Ongoing  Note: Patient voices no  needs before discharge. Patient plans to be discharged to home with parents. Discharge planner working with patient to achieve optimal discharge plans, specific to individual needs. Problem: KNOWLEDGE DEFICIT,EDUCATION,DISCHARGE PLAN  Goal: Knowledge - personal safety  7/9/2020 0008 by Sid Melendez RN  Outcome: Ongoing  Note: Pt remains safe and free of harm  7/8/2020 1322 by nAn Novoa RN  Outcome: Ongoing  Note: Maintained in safe and secure environment. Problem: Depressive Behavior With or Without Suicide Precautions:  Goal: Ability to disclose and discuss suicidal ideas will improve  Description: Ability to disclose and discuss suicidal ideas will improve  7/8/2020 1322 by Ann Novoa RN  Outcome: Completed  Note: Pt denied any SI at this time  Goal: Absence of self-harm  Description: Absence of self-harm  7/8/2020 1322 by Ann Novoa RN  Outcome: Completed  Note: No self harm behaviors were observed or reported so far this shift. Remains on every 15 minutes precautions for safety. Problem: Activity:  Goal: Sleeping patterns will improve  Description: Sleeping patterns will improve  7/8/2020 1322 by Ann Novoa RN  Outcome: Completed  Note: Patient slept 7.5 hours last night, reports they  slept well. Encourage patient not to take naps during the day, relax several hours before bed and to take prescribed sleep meds as ordered. Patient verbalized understanding. Care plan reviewed with patient and  verbalize understanding of the plan of care and contribute to goal setting.

## 2020-07-09 NOTE — BH NOTE
Group Therapy Note    Date: 7/9/2020  Start Time: 2000  End Time:  2020  Number of Participants: 1    Type of Group: Wrap-Up    Wellness Binder Information  Module Name:    Session Number:      Patient's Goal:  Get ready for discharge    Notes: Working on goal    Status After Intervention:  Improved    Participation Level: Active Listener    Participation Quality: Appropriate      Speech:  normal      Thought Process/Content: Logical      Affective Functioning: Congruent      Mood: bright      Level of consciousness:  Alert      Response to Learning: Able to verbalize current knowledge/experience      Endings: Suicidality    Modes of Intervention: Education      Discipline Responsible: Registered Nurse      Signature:   Zohaib Dey RN

## 2020-07-09 NOTE — BH NOTE
PLAN OF CARE:     Start Time: 0900  End Time:   0930    Group Topic:  Daily Goals    Group Type:   Goal Group    Intervention/Goal:  To increase support and identify daily goals    Attendance:  Attended group    Affect:  bright    Behavior:  Pleasant and cooperative    Response:   Identified a daily goal.     Daily Goal:    To get discharged today and go see my friends and family.      Progress:  Progressing to goal

## 2020-07-09 NOTE — DISCHARGE SUMMARY
nurses, social workers, PAMariposaC/CNP, and Attending physician. Discharge Medications:  Current Discharge Medication List      START taking these medications    Details   lamoTRIgine (LAMICTAL) 25 MG tablet Take 1 tablet by mouth daily  Qty: 30 tablet, Refills: 0         CONTINUE these medications which have CHANGED    Details   FLUoxetine (PROZAC) 20 MG capsule Take 1 capsule by mouth daily  Qty: 30 capsule, Refills: 3      ARIPiprazole (ABILIFY) 15 MG tablet Take 1 tablet by mouth daily  Qty: 30 tablet, Refills: 0         CONTINUE these medications which have NOT CHANGED    Details   hydrOXYzine (ATARAX) 50 MG tablet Take 50 mg by mouth 3 times daily as needed for Anxiety              Discharge Exam:  Level of consciousness:  Within normal limits  Appearance: Street clothes, seated, with good grooming  Behavior/Motor: No abnormalities noted  Attitude toward examiner:  Cooperative, attentive, good eye contact  Speech:  spontaneous, normal rate, normal volume and well articulated  Mood:  euthymic  Affect:  Full range  Thought processes:  linear, goal directed and coherent  Thought content:  denies homicidal ideation  Suicidal Ideation:  denies suicidal ideation  Delusions:  no evidence of delusions  Perceptual Disturbance:  denies any perceptual disturbance  Cognition:  Intact  Memory: age appropriate  Insight & Judgement: fair  Medication side effects: denies     Disposition: home    Patient Instructions: Activity: activity as tolerated  1. Patient instructed to take medications regularly and follow up with outpatient appointments. Follow-up as scheduled with [facility] at [address] [place] [address]:    Electronically signed by Baron Funes MD on 7/9/20 at 8:57 AM EDT    Time Spent on discharge is more than 35 minutes in the examination, evaluation, counseling and review of medications and discharge plan.       Eliot Emmanuel is a 23 y.o. female being evaluated by a Virtual Visit (video visit) encounter to address concerns as mentioned above. A caregiver was present in the room along with the patient. Pursuant to the emergency declaration under the [facility] [address] authority and the [employer] Act, this Virtual Visit was conducted with patient's (and/or legal guardian's) consent, to reduce the patient's risk of exposure to COVID-19 and provide necessary medical care. Services were provided through a video synchronous discussion virtually to substitute for in-person visit by provider. Patient is present at [address] [place] [address] on [address] and I am physically present at my home in [facility]     --Ruth Rebolledo MD on 7/9/2020 at 8:57 AM    An electronic signature was used to authenticate this note. **This report has been created using voice recognition software. It may contain minor errors which are inherent in voice recognition technology. **

## 2020-07-09 NOTE — PROGRESS NOTES
This RN has reviewed and agrees with Bartolo Mclean LPN's data collection and has collaborated with this LPN regarding the patient's discharge plan.

## 2020-07-10 ENCOUNTER — TELEPHONE (OUTPATIENT)
Dept: PSYCHIATRY | Age: 20
End: 2020-07-10

## 2020-09-29 ENCOUNTER — HOSPITAL ENCOUNTER (EMERGENCY)
Age: 20
Discharge: HOME | End: 2020-09-29
Payer: COMMERCIAL

## 2020-09-29 VITALS
OXYGEN SATURATION: 97 % | HEART RATE: 86 BPM | BODY MASS INDEX: 26.52 KG/M2 | TEMPERATURE: 98.3 F | DIASTOLIC BLOOD PRESSURE: 55 MMHG | RESPIRATION RATE: 16 BRPM | WEIGHT: 145 LBS | SYSTOLIC BLOOD PRESSURE: 107 MMHG

## 2020-09-29 LAB
ACETAMINOPHEN LEVEL: < 5 UG/ML (ref 0–20)
ALBUMIN SERPL-MCNC: 4.3 G/DL (ref 3.5–5.1)
ALP BLD-CCNC: 85 U/L (ref 38–126)
ALT SERPL-CCNC: 15 U/L (ref 11–66)
AMORPHOUS: ABNORMAL
AMPHETAMINE+METHAMPHETAMINE URINE SCREEN: NEGATIVE
ANION GAP SERPL CALCULATED.3IONS-SCNC: 9 MEQ/L (ref 8–16)
AST SERPL-CCNC: 13 U/L (ref 5–40)
BACTERIA: ABNORMAL /HPF
BARBITURATE QUANTITATIVE URINE: NEGATIVE
BASOPHILS # BLD: 0.3 %
BASOPHILS ABSOLUTE: 0 THOU/MM3 (ref 0–0.1)
BENZODIAZEPINE QUANTITATIVE URINE: NEGATIVE
BILIRUB SERPL-MCNC: 0.7 MG/DL (ref 0.3–1.2)
BILIRUBIN URINE: NEGATIVE
BLOOD, URINE: NEGATIVE
BUN BLDV-MCNC: 8 MG/DL (ref 7–22)
CALCIUM SERPL-MCNC: 8.9 MG/DL (ref 8.5–10.5)
CANNABINOID QUANTITATIVE URINE: NEGATIVE
CASTS 2: ABNORMAL /LPF
CASTS UA: ABNORMAL /LPF
CHARACTER, URINE: ABNORMAL
CHLORIDE BLD-SCNC: 102 MEQ/L (ref 98–111)
CO2: 25 MEQ/L (ref 23–33)
COCAINE METABOLITE QUANTITATIVE URINE: NEGATIVE
COLOR: YELLOW
CREAT SERPL-MCNC: 0.6 MG/DL (ref 0.4–1.2)
CRYSTALS, UA: ABNORMAL
EOSINOPHIL # BLD: 0.3 %
EOSINOPHILS ABSOLUTE: 0 THOU/MM3 (ref 0–0.4)
EPITHELIAL CELLS, UA: ABNORMAL /HPF
ERYTHROCYTE [DISTWIDTH] IN BLOOD BY AUTOMATED COUNT: 12.3 % (ref 11.5–14.5)
ERYTHROCYTE [DISTWIDTH] IN BLOOD BY AUTOMATED COUNT: 41.6 FL (ref 35–45)
ETHYL ALCOHOL, SERUM: < 0.01 %
GFR SERPL CREATININE-BSD FRML MDRD: > 90 ML/MIN/1.73M2
GLUCOSE BLD-MCNC: 83 MG/DL (ref 70–108)
GLUCOSE URINE: NEGATIVE MG/DL
HCT VFR BLD CALC: 45.5 % (ref 37–47)
HEMOGLOBIN: 14.8 GM/DL (ref 12–16)
IMMATURE GRANS (ABS): 0 THOU/MM3 (ref 0–0.07)
IMMATURE GRANULOCYTES: 0 %
KETONES, URINE: 15
LEUKOCYTE ESTERASE, URINE: ABNORMAL
LYMPHOCYTES # BLD: 23.4 %
LYMPHOCYTES ABSOLUTE: 0.8 THOU/MM3 (ref 1–4.8)
MCH RBC QN AUTO: 30.2 PG (ref 26–33)
MCHC RBC AUTO-ENTMCNC: 32.5 GM/DL (ref 32.2–35.5)
MCV RBC AUTO: 92.9 FL (ref 81–99)
MISCELLANEOUS 2: ABNORMAL
MONOCYTES # BLD: 15.9 %
MONOCYTES ABSOLUTE: 0.6 THOU/MM3 (ref 0.4–1.3)
MUCUS: ABNORMAL
NITRITE, URINE: NEGATIVE
NUCLEATED RED BLOOD CELLS: 0 /100 WBC
OPIATES, URINE: NEGATIVE
OSMOLALITY CALCULATION: 269.4 MOSMOL/KG (ref 275–300)
OXYCODONE: NEGATIVE
PH UA: 6.5 (ref 5–9)
PHENCYCLIDINE QUANTITATIVE URINE: NEGATIVE
PLATELET # BLD: 188 THOU/MM3 (ref 130–400)
PMV BLD AUTO: 10.9 FL (ref 9.4–12.4)
POTASSIUM REFLEX MAGNESIUM: 3.9 MEQ/L (ref 3.5–5.2)
PREGNANCY, SERUM: NEGATIVE
PROTEIN UA: NEGATIVE
RBC # BLD: 4.9 MILL/MM3 (ref 4.2–5.4)
RBC URINE: ABNORMAL /HPF
RENAL EPITHELIAL, UA: ABNORMAL
SALICYLATE, SERUM: < 0.3 MG/DL (ref 2–10)
SEG NEUTROPHILS: 60.1 %
SEGMENTED NEUTROPHILS ABSOLUTE COUNT: 2.2 THOU/MM3 (ref 1.8–7.7)
SODIUM BLD-SCNC: 136 MEQ/L (ref 135–145)
SPECIFIC GRAVITY, URINE: 1.02 (ref 1–1.03)
TOTAL PROTEIN: 7.6 G/DL (ref 6.1–8)
TSH SERPL DL<=0.05 MIU/L-ACNC: 0.93 UIU/ML (ref 0.4–4.2)
UROBILINOGEN, URINE: 0.2 EU/DL (ref 0–1)
WBC # BLD: 3.6 THOU/MM3 (ref 4.8–10.8)
WBC UA: ABNORMAL /HPF
YEAST: ABNORMAL

## 2020-09-29 PROCEDURE — 36415 COLL VENOUS BLD VENIPUNCTURE: CPT

## 2020-09-29 PROCEDURE — 80053 COMPREHEN METABOLIC PANEL: CPT

## 2020-09-29 PROCEDURE — 81001 URINALYSIS AUTO W/SCOPE: CPT

## 2020-09-29 PROCEDURE — 80307 DRUG TEST PRSMV CHEM ANLYZR: CPT

## 2020-09-29 PROCEDURE — G0480 DRUG TEST DEF 1-7 CLASSES: HCPCS

## 2020-09-29 PROCEDURE — 85025 COMPLETE CBC W/AUTO DIFF WBC: CPT

## 2020-09-29 PROCEDURE — 84443 ASSAY THYROID STIM HORMONE: CPT

## 2020-09-29 PROCEDURE — 99284 EMERGENCY DEPT VISIT MOD MDM: CPT

## 2020-09-29 PROCEDURE — 99283 EMERGENCY DEPT VISIT LOW MDM: CPT

## 2020-09-29 PROCEDURE — 87086 URINE CULTURE/COLONY COUNT: CPT

## 2020-09-29 PROCEDURE — 84703 CHORIONIC GONADOTROPIN ASSAY: CPT

## 2020-09-29 ASSESSMENT — SLEEP AND FATIGUE QUESTIONNAIRES
SLEEP PATTERN: RESTLESSNESS
DIFFICULTY ARISING: NO
DO YOU HAVE DIFFICULTY SLEEPING: YES
RESTFUL SLEEP: NO
DO YOU USE A SLEEP AID: NO
DIFFICULTY FALLING ASLEEP: NO
DIFFICULTY STAYING ASLEEP: YES

## 2020-09-29 ASSESSMENT — ENCOUNTER SYMPTOMS
COLOR CHANGE: 0
COUGH: 0
ABDOMINAL PAIN: 0
SHORTNESS OF BREATH: 0
BACK PAIN: 0

## 2020-09-29 NOTE — ED NOTES
Presents with complaints of depression. She is tearful at this time. States that there is just so much going on in her life right now. When asked if she was having suicidal thoughts she just stated that she did not want to be around anymore. She denies any hallucinations. She changed into hospital garb and was placed in safe room with continuous visual observation by campus police.      Karla Erickson RN  09/29/20 4199

## 2020-09-29 NOTE — ED PROVIDER NOTES
[address]          CHIEF COMPLAINT       Chief Complaint   Patient presents with    Depression       Nurses Notes reviewed and I agree except as noted in the HPI. HISTORY OF PRESENT ILLNESS    Real Butler is a 23 y.o. female who presents to the Emergency Department for the evaluation of depression and suicidal ideation. The patient was brought to the emergency department by her father for concerns of her suicidal thoughts. The patient reports that last night she contacted her boyfriend that she broke up with several months ago. She realized there was no future with this ex-boyfriend which made her very sad. She states that she has thought about either drinking herself to death or taking enough medication to make her go to sleep and never wake up. The patient reports that she has had suicidal thoughts in the past.  She states that she knows that she does not want to act upon her suicidal thoughts and reaches out for help. Patient has had history of suicide attempts. She has had inpatient psychiatric treatment twice in 2020 both for suicidal thoughts. The patient currently is not taking her antidepressant and psychotropic medications. She states she has not taken in several weeks if not a month. Patient denies any physical complaints. No fever, cough, shortness of breath. She denies any action upon her thoughts of hurting herself. No self injury. Her last menstrual period was 3 weeks ago. The HPI was provided by the patient. REVIEW OF SYSTEMS     Review of Systems   Constitutional: Negative for fatigue and fever. Respiratory: Negative for cough and shortness of breath. Gastrointestinal: Negative for abdominal pain. Musculoskeletal: Negative for back pain. Skin: Negative for color change. Neurological: Negative for dizziness and weakness. Psychiatric/Behavioral: Positive for sleep disturbance and suicidal ideas. pulses. Pulmonary:      Effort: Pulmonary effort is normal. No respiratory distress. Breath sounds: Normal breath sounds. Abdominal:      General: Abdomen is flat. Bowel sounds are normal. There is no distension. Tenderness: There is no abdominal tenderness. Skin:     General: Skin is warm and dry. Capillary Refill: Capillary refill takes less than 2 seconds. Neurological:      General: No focal deficit present. Mental Status: She is alert and oriented to person, place, and time. Psychiatric:         Mood and Affect: Affect is tearful. Speech: Speech normal.         Behavior: Behavior normal.         Thought Content: Thought content includes suicidal ideation. Thought content includes suicidal plan.           DIFFERENTIAL DIAGNOSIS:     Major depressive disorder, bipolar disorder with depressive features, suicidal ideation, substance abuse  DIAGNOSTIC RESULTS     EKG: All EKG's are interpreted by the Emergency Department Physician who either signs or Co-signs this chart in the absence of a cardiologist.    None    RADIOLOGY: non-plainfilm images(s) such as CT, Ultrasound and MRI are read by the radiologist.    No orders to display       LABS:     Labs Reviewed   CBC WITH AUTO DIFFERENTIAL - Abnormal; Notable for the following components:       Result Value    WBC 3.6 (*)     Lymphocytes Absolute 0.8 (*)     All other components within normal limits   SALICYLATE LEVEL - Abnormal; Notable for the following components:    Salicylate, Serum < 0.3 (*)     All other components within normal limits   URINE WITH REFLEXED MICRO - Abnormal; Notable for the following components:    Ketones, Urine 15 (*)     Leukocyte Esterase, Urine SMALL (*)     Character, Urine CLOUDY (*)     All other components within normal limits   OSMOLALITY - Abnormal; Notable for the following components:    Osmolality Calc 269.4 (*)     All other components within normal limits   CULTURE, REFLEXED, URINE Narrative:     Source: Specimen not received       Site:           Current Antibiotics:   URINE DRUG SCREEN   HCG, SERUM, QUALITATIVE   COMPREHENSIVE METABOLIC PANEL W/ REFLEX TO MG FOR LOW K   TSH WITHOUT REFLEX   ETHANOL   ACETAMINOPHEN LEVEL   ANION GAP   GLOMERULAR FILTRATION RATE, ESTIMATED       EMERGENCY DEPARTMENT COURSE:   Vitals:    Vitals:    09/29/20 1353 09/29/20 1758   BP: 129/74 (!) 107/55   Pulse: 90 86   Resp: 18 16   Temp: 98.3 °F (36.8 °C)    TempSrc: Oral    SpO2: 100% 97%   Weight: 145 lb (65.8 kg)        3:01 PM EDT: The patient was seen and evaluated. Patient was medically cleared with appropriate labs. All of her labs are reassuring. Patient was assessed by behavioral access team and discussed the case with , call psychiatrist.    He advised that it is inappropriate to continue to readmit the patient when she is medically noncompliant after admission. He advised that she needs to start her medications again and follow-up with her outpatient resources. MDM:  Patient will be discharged. She is advised to take her home medications as prescribed. Follow-up with her psychiatrist outpatient calling tomorrow for an appointment. Remain with her family members tonight. She is given the number for the [facility] line to call if she has any suicidal ideation. She is also advised to talk to her family members for any increase in depression or suicidal thoughts. CRITICAL CARE:   None    CONSULTS:  none    PROCEDURES:  None    FINAL IMPRESSION      1. Current mild episode of major depressive disorder, unspecified whether recurrent [facility])          DISPOSITION/PLAN   Discharge    PATIENT REFERRED TO:  Wei Garcia DO          [address] [address]  [address] [facility]  [phone]  Call         DISCHARGE MEDICATIONS:  Discharge Medication List as of 9/29/2020  6:13 PM          (Please note that portions of this note were completed with a voice recognition program.  Efforts were made to edit the dictations but occasionally words are mis-transcribed.)    The patient was given an opportunity to see the Emergency Attending. The patient voiced understanding that I was a Mid-LevelProvider and was in agreement with being seen independently by myself.           Alison Gillespie, CHAN - CNP  09/29/20 9065

## 2020-09-29 NOTE — ED NOTES
Pt reassessed at this time. Pt resting on cot with eyes closed and lights dimmed for comfort. Pt denies any ain at this time. Denies any needs. Call light in reach.  Will continue to monitor      Yan Hardy RN  09/29/20 2606

## 2020-09-29 NOTE — ED NOTES
Pt resting on cot with eyes closed. Pt does not appear in any distress. Pt denies any pain. Denies any needs. Call light in reach.  Will continue to monitor      Mahi Lay RN  09/29/20 1355

## 2020-09-29 NOTE — PROGRESS NOTES
Provisional Diagnosis:   Bipolar Disorder      Risk, Psychosocial and Contextual Factors:  Broke up with boyfriend 9 months ago, pt is not following up with outpatient mental health treatment, non-compliant with taking prescribed psychotropic medication    Current  Treatment:  Psychiatrist in [place], therapist (Dr. Ygnacio Ahumada) also in [place]      Present Suicidal Behavior:      Verbal:  \"They come and go, I don't want to do anything, I just don't want to exhist\"         Attempt: Denies     Access to Weapons:   Denies     Current Suicide Risk: Low, Moderate or High:   High     Past Suicidal Behavior:       Verbal: X    Attempt:     Self-Injurious/Self-Mutilation:     Traumatic Event Within Past 2 Weeks:       Current Abuse:     Legal:     Violence:     Protective Factors:      Housing:        CPAP/Oxygen/Ambulation Difficulties:     Basic Vital Signs Normal?: Check with Patients Nurse prior to Calling Psychiatry    Critical Labs?: Check with Patients Nurse prior to Calling Psychiatry    Clinical Summary:      Pt is a 23year old female escorted to [facility] ED voluntarily by her father due to suicidal ideation. Pt presents with circumstantial speech and flight of ideas. Pt reportedly is diagnosed with MDD, anxiety, bipolar disorder and BPD. Pt is non-compliant with taking her prescribed psychotropic medication (prozac, lamictal, abilify), duration unknown. Pts psychiatrist (name unknown) and therapist (Dr. Ygnacio Ahumada) are located in [place], pt report non-compliance with attending appointments. Pt and her boyfriend broke up 9 months ago \"we have to get ourselves in order\". Pt reportedly talked to her ex-boyfriend last night which sent her into an \"ultimate low\" which continued today. Pt stated \"sometimes I feel liam I want to overdose on something and not wake up\". Clinician attempted to assess the last time pt felt this way pt however would not give a clear response.    Pt also mentioned wanting to \"drink myself into oblivion\". In regards to current suicidal thoughts pt state \"they come and go, I don't want to do anything, I just don't want to exist\". Homicidal thoughts denied, hallucinations denied, no delusions noted. Pt report a history of suicide attempts. Pt is has a history of inpatient psychiatric treatment at [facility] on 5/2/20 and 7/6/20. Pt report a fluctuating appetite and increased sleep. Pt is oriented x4, impaired insight/judgment, fair eye contact, cooperative, depressed, crying spells. Pt is seeking inpatient psychiatric treatment. Level of Care Disposition:      4021  Pt medically cleared by ED Provider, Rae Gregory CNP.    [id number]  Call to Dr. Elisha Mullins, no answer, left a voicemail message requesting a return call. [id number]  Call from Dr. Elisha Mullins who recommend discharge to follow up outpatient with current psychiatrist and therapist. Dr. Elisha Mullins also recommend compliance with prescribed psychotropic medication. Rae Gregory CNP and pt updated. Pt to be discharged.

## 2020-09-29 NOTE — ED NOTES
Pt resting on cot with eyes closed. Pt does not appear in distress. Denies any needs. Call light in reach.  Will continue to monitor      Brock Goldberg RN  09/29/20 [id number]

## 2020-10-01 LAB
ORGANISM: ABNORMAL
URINE CULTURE REFLEX: ABNORMAL